# Patient Record
Sex: FEMALE | URBAN - METROPOLITAN AREA
[De-identification: names, ages, dates, MRNs, and addresses within clinical notes are randomized per-mention and may not be internally consistent; named-entity substitution may affect disease eponyms.]

---

## 2019-09-19 ENCOUNTER — APPOINTMENT (RX ONLY)
Dept: URBAN - METROPOLITAN AREA CLINIC 4 | Facility: CLINIC | Age: 69
Setting detail: DERMATOLOGY
End: 2019-09-19

## 2019-09-19 DIAGNOSIS — D22 MELANOCYTIC NEVI: ICD-10-CM

## 2019-09-19 DIAGNOSIS — L82.1 OTHER SEBORRHEIC KERATOSIS: ICD-10-CM

## 2019-09-19 DIAGNOSIS — B07.8 OTHER VIRAL WARTS: ICD-10-CM

## 2019-09-19 DIAGNOSIS — L57.0 ACTINIC KERATOSIS: ICD-10-CM

## 2019-09-19 DIAGNOSIS — L81.4 OTHER MELANIN HYPERPIGMENTATION: ICD-10-CM

## 2019-09-19 DIAGNOSIS — D18.0 HEMANGIOMA: ICD-10-CM

## 2019-09-19 PROBLEM — J45.909 UNSPECIFIED ASTHMA, UNCOMPLICATED: Status: ACTIVE | Noted: 2019-09-19

## 2019-09-19 PROBLEM — D18.01 HEMANGIOMA OF SKIN AND SUBCUTANEOUS TISSUE: Status: ACTIVE | Noted: 2019-09-19

## 2019-09-19 PROBLEM — E05.90 THYROTOXICOSIS, UNSPECIFIED WITHOUT THYROTOXIC CRISIS OR STORM: Status: ACTIVE | Noted: 2019-09-19

## 2019-09-19 PROBLEM — D48.5 NEOPLASM OF UNCERTAIN BEHAVIOR OF SKIN: Status: ACTIVE | Noted: 2019-09-19

## 2019-09-19 PROBLEM — D22.5 MELANOCYTIC NEVI OF TRUNK: Status: ACTIVE | Noted: 2019-09-19

## 2019-09-19 PROCEDURE — 11102 TANGNTL BX SKIN SINGLE LES: CPT

## 2019-09-19 PROCEDURE — 17000 DESTRUCT PREMALG LESION: CPT | Mod: 59

## 2019-09-19 PROCEDURE — ? LIQUID NITROGEN

## 2019-09-19 PROCEDURE — ? BIOPSY BY SHAVE METHOD

## 2019-09-19 PROCEDURE — ? COUNSELING

## 2019-09-19 PROCEDURE — 99203 OFFICE O/P NEW LOW 30 MIN: CPT | Mod: 25

## 2019-09-19 ASSESSMENT — LOCATION ZONE DERM
LOCATION ZONE: ARM
LOCATION ZONE: FACE
LOCATION ZONE: LEG
LOCATION ZONE: TRUNK

## 2019-09-19 ASSESSMENT — LOCATION DETAILED DESCRIPTION DERM
LOCATION DETAILED: PERIUMBILICAL SKIN
LOCATION DETAILED: LEFT DISTAL PRETIBIAL REGION
LOCATION DETAILED: RIGHT SUPERIOR FOREHEAD
LOCATION DETAILED: LEFT MEDIAL UPPER BACK
LOCATION DETAILED: RIGHT DISTAL PRETIBIAL REGION
LOCATION DETAILED: RIGHT INFERIOR MEDIAL UPPER BACK
LOCATION DETAILED: RIGHT LATERAL DISTAL PRETIBIAL REGION
LOCATION DETAILED: EPIGASTRIC SKIN
LOCATION DETAILED: RIGHT DISTAL DORSAL FOREARM

## 2019-09-19 ASSESSMENT — LOCATION SIMPLE DESCRIPTION DERM
LOCATION SIMPLE: LEFT PRETIBIAL REGION
LOCATION SIMPLE: RIGHT UPPER BACK
LOCATION SIMPLE: RIGHT FOREARM
LOCATION SIMPLE: RIGHT FOREHEAD
LOCATION SIMPLE: ABDOMEN
LOCATION SIMPLE: RIGHT PRETIBIAL REGION
LOCATION SIMPLE: LEFT UPPER BACK

## 2019-09-19 ASSESSMENT — PAIN INTENSITY VAS: HOW INTENSE IS YOUR PAIN 0 BEING NO PAIN, 10 BEING THE MOST SEVERE PAIN POSSIBLE?: NO PAIN

## 2019-09-19 NOTE — PROCEDURE: MIPS QUALITY
Quality 474: Zoster Vaccination Status: Shingrix Vaccination not Administered or Previously Received, Reason not Otherwise Specified
Quality 111:Pneumonia Vaccination Status For Older Adults: Pneumococcal Vaccination not Administered or Previously Received, Reason not Otherwise Specified
Detail Level: Detailed
Quality 130: Documentation Of Current Medications In The Medical Record: Current Medications Documented
Quality 131: Pain Assessment And Follow-Up: Pain assessment using a standardized tool is documented as negative, no follow-up plan required
Quality 110: Preventive Care And Screening: Influenza Immunization: Influenza immunization was not ordered or administered, reason not given

## 2019-09-19 NOTE — PROCEDURE: COUNSELING
Detail Level: Generalized
Patient Specific Counseling (Will Not Stick From Patient To Patient): Patient advised to discard razors and to hold on shaving if possible.
Detail Level: Zone
Detail Level: Detailed

## 2019-09-19 NOTE — HPI: EVALUATION OF SKIN LESION(S)
What Type Of Note Output Would You Prefer (Optional)?: Standard Output
Hpi Title: Evaluation of Skin Lesions
How Severe Are Your Spot(S)?: mild
Have Your Spot(S) Been Treated In The Past?: has not been treated
Family Member: Nephew
Additional History: Last saw dermatologist in [place] more than 5 years ago.

## 2019-09-19 NOTE — PROCEDURE: BIOPSY BY SHAVE METHOD
Billing Type: Third-Party Bill
Render Path Notes In Note?: No
Hemostasis: Drysol
Lab: [id number]
Detail Level: Detailed
Biopsy Method: Dermablade
Curettage Text: The wound bed was treated with curettage after the biopsy was performed.
Wound Care: Petrolatum
Additional Anesthesia Volume In Cc (Will Not Render If 0): 0
Electrodesiccation Text: The wound bed was treated with electrodesiccation after the biopsy was performed.
Cryotherapy Text: The wound bed was treated with cryotherapy after the biopsy was performed.
Anesthesia Type: 1% lidocaine with epinephrine
Post-Care Instructions: I reviewed with the patient in detail post-care instructions. Patient is to keep the biopsy site dry overnight, and then apply bacitracin twice daily until healed. Patient may apply hydrogen peroxide soaks to remove any crusting.
Lab Facility: [id number]
Size Of Lesion In Cm: 0.3
Type Of Destruction Used: Curettage
Biopsy Type: H and E
Electrodesiccation And Curettage Text: The wound bed was treated with electrodesiccation and curettage after the biopsy was performed.
Consent: Written consent was obtained and risks were reviewed including but not limited to scarring, infection, bleeding, scabbing, incomplete removal, nerve damage and allergy to anesthesia.
Depth Of Biopsy: dermis
Notification Instructions: Patient will be notified of biopsy results. However, patient instructed to call the office if not contacted within 2 weeks.
Silver Nitrate Text: The wound bed was treated with silver nitrate after the biopsy was performed.
Was A Bandage Applied: Yes
Anesthesia Volume In Cc (Will Not Render If 0): 0.5
Dressing: bandage

## 2021-01-20 ENCOUNTER — APPOINTMENT (RX ONLY)
Dept: URBAN - METROPOLITAN AREA CLINIC 4 | Facility: CLINIC | Age: 71
Setting detail: DERMATOLOGY
End: 2021-01-20

## 2021-01-20 DIAGNOSIS — D22 MELANOCYTIC NEVI: ICD-10-CM

## 2021-01-20 DIAGNOSIS — D18.0 HEMANGIOMA: ICD-10-CM

## 2021-01-20 DIAGNOSIS — L57.8 OTHER SKIN CHANGES DUE TO CHRONIC EXPOSURE TO NONIONIZING RADIATION: ICD-10-CM | Status: STABLE

## 2021-01-20 DIAGNOSIS — L81.4 OTHER MELANIN HYPERPIGMENTATION: ICD-10-CM

## 2021-01-20 DIAGNOSIS — L82.1 OTHER SEBORRHEIC KERATOSIS: ICD-10-CM

## 2021-01-20 DIAGNOSIS — L72.8 OTHER FOLLICULAR CYSTS OF THE SKIN AND SUBCUTANEOUS TISSUE: ICD-10-CM

## 2021-01-20 PROBLEM — D18.01 HEMANGIOMA OF SKIN AND SUBCUTANEOUS TISSUE: Status: ACTIVE | Noted: 2021-01-20

## 2021-01-20 PROBLEM — D22.5 MELANOCYTIC NEVI OF TRUNK: Status: ACTIVE | Noted: 2021-01-20

## 2021-01-20 PROCEDURE — ? COUNSELING

## 2021-01-20 PROCEDURE — ? CONSULTATION EXCISION

## 2021-01-20 PROCEDURE — 99213 OFFICE O/P EST LOW 20 MIN: CPT

## 2021-01-20 PROCEDURE — ? SUNSCREEN TREATMENT REGIMEN

## 2021-01-20 ASSESSMENT — LOCATION DETAILED DESCRIPTION DERM
LOCATION DETAILED: LEFT CENTRAL TEMPLE
LOCATION DETAILED: RIGHT CENTRAL MALAR CHEEK
LOCATION DETAILED: PERIUMBILICAL SKIN
LOCATION DETAILED: RIGHT LATERAL MANDIBULAR CHEEK
LOCATION DETAILED: RIGHT INFERIOR MEDIAL UPPER BACK
LOCATION DETAILED: LEFT MEDIAL UPPER BACK
LOCATION DETAILED: EPIGASTRIC SKIN
LOCATION DETAILED: RIGHT MEDIAL ZYGOMA

## 2021-01-20 ASSESSMENT — LOCATION SIMPLE DESCRIPTION DERM
LOCATION SIMPLE: LEFT UPPER BACK
LOCATION SIMPLE: RIGHT CHEEK
LOCATION SIMPLE: RIGHT UPPER BACK
LOCATION SIMPLE: RIGHT ZYGOMA
LOCATION SIMPLE: LEFT TEMPLE
LOCATION SIMPLE: ABDOMEN

## 2021-01-20 ASSESSMENT — LOCATION ZONE DERM
LOCATION ZONE: FACE
LOCATION ZONE: TRUNK

## 2021-01-20 NOTE — HPI: EVALUATION OF SKIN LESION(S)
What Type Of Note Output Would You Prefer (Optional)?: Standard Output
Hpi Title: Evaluation of Skin Lesions
Have Your Spot(S) Been Treated In The Past?: has not been treated
Family Member: Nephew

## 2021-01-20 NOTE — PROCEDURE: MIPS QUALITY
Quality 431: Preventive Care And Screening: Unhealthy Alcohol Use - Screening: Patient screened for unhealthy alcohol use using a single question and scores less than 2 times per year
Quality 110: Preventive Care And Screening: Influenza Immunization: Influenza Immunization not Administered because Patient Refused.
Quality 226: Preventive Care And Screening: Tobacco Use: Screening And Cessation Intervention: Patient screened for tobacco use and is an ex/non-smoker
Quality 111:Pneumonia Vaccination Status For Older Adults: Pneumococcal Vaccination not Administered or Previously Received, Reason not Otherwise Specified
Detail Level: Detailed
Quality 130: Documentation Of Current Medications In The Medical Record: Current Medications Documented

## 2021-01-20 NOTE — HPI: SKIN LESIONS
How Severe Is Your Skin Lesion?: mild
Have Your Skin Lesions Been Treated?: not been treated
Is This A New Presentation, Or A Follow-Up?: Skin Lesions
Which Family Member (Optional)?: Nephew

## 2022-02-02 ENCOUNTER — APPOINTMENT (RX ONLY)
Dept: URBAN - METROPOLITAN AREA CLINIC 4 | Facility: CLINIC | Age: 72
Setting detail: DERMATOLOGY
End: 2022-02-02

## 2022-02-02 DIAGNOSIS — L72.8 OTHER FOLLICULAR CYSTS OF THE SKIN AND SUBCUTANEOUS TISSUE: ICD-10-CM

## 2022-02-02 DIAGNOSIS — L82.1 OTHER SEBORRHEIC KERATOSIS: ICD-10-CM

## 2022-02-02 DIAGNOSIS — D22 MELANOCYTIC NEVI: ICD-10-CM

## 2022-02-02 DIAGNOSIS — L82.0 INFLAMED SEBORRHEIC KERATOSIS: ICD-10-CM

## 2022-02-02 DIAGNOSIS — D18.0 HEMANGIOMA: ICD-10-CM

## 2022-02-02 DIAGNOSIS — L57.8 OTHER SKIN CHANGES DUE TO CHRONIC EXPOSURE TO NONIONIZING RADIATION: ICD-10-CM

## 2022-02-02 DIAGNOSIS — L81.4 OTHER MELANIN HYPERPIGMENTATION: ICD-10-CM

## 2022-02-02 DIAGNOSIS — L70.8 OTHER ACNE: ICD-10-CM

## 2022-02-02 PROBLEM — D18.01 HEMANGIOMA OF SKIN AND SUBCUTANEOUS TISSUE: Status: ACTIVE | Noted: 2022-02-02

## 2022-02-02 PROBLEM — D22.5 MELANOCYTIC NEVI OF TRUNK: Status: ACTIVE | Noted: 2022-02-02

## 2022-02-02 PROCEDURE — ? SUNSCREEN TREATMENT REGIMEN

## 2022-02-02 PROCEDURE — 17110 DESTRUCTION B9 LES UP TO 14: CPT

## 2022-02-02 PROCEDURE — ? EXTRACTIONS

## 2022-02-02 PROCEDURE — ? OBSERVATION AND MEASURE

## 2022-02-02 PROCEDURE — ? CONSULTATION EXCISION

## 2022-02-02 PROCEDURE — ? LIQUID NITROGEN

## 2022-02-02 PROCEDURE — 99213 OFFICE O/P EST LOW 20 MIN: CPT | Mod: 25

## 2022-02-02 ASSESSMENT — LOCATION DETAILED DESCRIPTION DERM
LOCATION DETAILED: RIGHT INFERIOR LATERAL MALAR CHEEK
LOCATION DETAILED: RIGHT INFERIOR PREAURICULAR CHEEK
LOCATION DETAILED: PERIUMBILICAL SKIN
LOCATION DETAILED: LEFT CENTRAL TEMPLE
LOCATION DETAILED: RIGHT CENTRAL MALAR CHEEK
LOCATION DETAILED: RIGHT INFERIOR CENTRAL MALAR CHEEK
LOCATION DETAILED: LEFT MEDIAL UPPER BACK
LOCATION DETAILED: EPIGASTRIC SKIN
LOCATION DETAILED: RIGHT INFERIOR MEDIAL UPPER BACK
LOCATION DETAILED: RIGHT MEDIAL ZYGOMA

## 2022-02-02 ASSESSMENT — LOCATION SIMPLE DESCRIPTION DERM
LOCATION SIMPLE: LEFT TEMPLE
LOCATION SIMPLE: RIGHT UPPER BACK
LOCATION SIMPLE: RIGHT ZYGOMA
LOCATION SIMPLE: ABDOMEN
LOCATION SIMPLE: LEFT UPPER BACK
LOCATION SIMPLE: RIGHT CHEEK

## 2022-02-02 ASSESSMENT — LOCATION ZONE DERM
LOCATION ZONE: FACE
LOCATION ZONE: TRUNK

## 2022-02-02 NOTE — PROCEDURE: LIQUID NITROGEN
Spray Paint Technique: No
Show Topical Anesthesia Variable?: Yes
Spray Paint Text: The liquid nitrogen was applied to the skin utilizing a spray paint frosting technique.
Consent: The patient's consent was obtained including but not limited to risks of crusting, scabbing, blistering, scarring, darker or lighter pigmentary change, recurrence, incomplete removal and infection.
Medical Necessity Information: It is in your best interest to select a reason for this procedure from the list below. All of these items fulfill various CMS LCD requirements except the new and changing color options.
Detail Level: Detailed
Post-Care Instructions: I reviewed with the patient in detail post-care instructions. Patient is to wear sunprotection, and avoid picking at any of the treated lesions. Pt may apply Vaseline to crusted or scabbing areas.
Medical Necessity Clause: This procedure was medically necessary because the lesions that were treated were:

## 2022-02-02 NOTE — PROCEDURE: CONSULTATION EXCISION
Detail Level: Detailed
X Size Of Lesion In Cm (Optional): 0
Size Of Lesion: 1
[Negative] : Endocrine

## 2022-02-02 NOTE — PROCEDURE: EXTRACTIONS
Post-Care Instructions: I reviewed with the patient in detail post-care instructions. Patient is to keep the treatment areas dry overnight, and then apply bacitracin twice daily until healed. Patient may apply hydrogen peroxide soaks to remove any crusting.
Extraction Method: 21 gauge needle and comedo extractor
Render Post-Care Instructions In Note?: no
Consent was obtained and risks were reviewed including but not limited to scarring, infection, bleeding, scabbing, incomplete removal, and allergy to anesthesia.
Detail Level: Detailed
Prep Text (Optional): Prior to removal the treatment areas were prepped in the usual fashion.
Acne Type: A cyst

## 2022-05-24 ENCOUNTER — OFFICE VISIT (OUTPATIENT)
Dept: FAMILY MEDICINE CLINIC | Facility: CLINIC | Age: 72
End: 2022-05-24
Payer: COMMERCIAL

## 2022-05-24 VITALS
HEIGHT: 62 IN | SYSTOLIC BLOOD PRESSURE: 90 MMHG | WEIGHT: 118 LBS | OXYGEN SATURATION: 98 % | BODY MASS INDEX: 21.71 KG/M2 | TEMPERATURE: 96.7 F | HEART RATE: 78 BPM | DIASTOLIC BLOOD PRESSURE: 60 MMHG

## 2022-05-24 DIAGNOSIS — Z86.010 PERSONAL HISTORY OF COLONIC POLYPS: ICD-10-CM

## 2022-05-24 DIAGNOSIS — Z00.00 ANNUAL PHYSICAL EXAM: Primary | ICD-10-CM

## 2022-05-24 DIAGNOSIS — M81.0 AGE-RELATED OSTEOPOROSIS WITHOUT CURRENT PATHOLOGICAL FRACTURE: ICD-10-CM

## 2022-05-24 DIAGNOSIS — Z13.83 SCREENING FOR CARDIOVASCULAR, RESPIRATORY, AND GENITOURINARY DISEASES: ICD-10-CM

## 2022-05-24 DIAGNOSIS — E06.3 HYPOTHYROIDISM DUE TO HASHIMOTO'S THYROIDITIS: ICD-10-CM

## 2022-05-24 DIAGNOSIS — Z13.6 SCREENING FOR CARDIOVASCULAR, RESPIRATORY, AND GENITOURINARY DISEASES: ICD-10-CM

## 2022-05-24 DIAGNOSIS — E03.8 HYPOTHYROIDISM DUE TO HASHIMOTO'S THYROIDITIS: ICD-10-CM

## 2022-05-24 DIAGNOSIS — Z13.89 SCREENING FOR CARDIOVASCULAR, RESPIRATORY, AND GENITOURINARY DISEASES: ICD-10-CM

## 2022-05-24 DIAGNOSIS — Z12.31 ENCOUNTER FOR SCREENING MAMMOGRAM FOR BREAST CANCER: ICD-10-CM

## 2022-05-24 DIAGNOSIS — E55.9 VITAMIN D DEFICIENCY: ICD-10-CM

## 2022-05-24 PROBLEM — M54.50 CHRONIC BILATERAL LOW BACK PAIN WITHOUT SCIATICA: Status: ACTIVE | Noted: 2021-04-14

## 2022-05-24 PROBLEM — S52.501D CLOSED FRACTURE OF DISTAL END OF RIGHT RADIUS WITH ROUTINE HEALING: Status: ACTIVE | Noted: 2020-09-24

## 2022-05-24 PROBLEM — E03.9 HYPOTHYROIDISM: Status: ACTIVE | Noted: 2022-05-24

## 2022-05-24 PROBLEM — G89.29 CHRONIC BILATERAL LOW BACK PAIN WITHOUT SCIATICA: Status: ACTIVE | Noted: 2021-04-14

## 2022-05-24 PROCEDURE — 1036F TOBACCO NON-USER: CPT | Performed by: FAMILY MEDICINE

## 2022-05-24 PROCEDURE — 1160F RVW MEDS BY RX/DR IN RCRD: CPT | Performed by: FAMILY MEDICINE

## 2022-05-24 PROCEDURE — 3725F SCREEN DEPRESSION PERFORMED: CPT | Performed by: FAMILY MEDICINE

## 2022-05-24 PROCEDURE — 3008F BODY MASS INDEX DOCD: CPT | Performed by: FAMILY MEDICINE

## 2022-05-24 PROCEDURE — 3288F FALL RISK ASSESSMENT DOCD: CPT | Performed by: FAMILY MEDICINE

## 2022-05-24 PROCEDURE — 1101F PT FALLS ASSESS-DOCD LE1/YR: CPT | Performed by: FAMILY MEDICINE

## 2022-05-24 PROCEDURE — 99387 INIT PM E/M NEW PAT 65+ YRS: CPT | Performed by: FAMILY MEDICINE

## 2022-05-24 RX ORDER — LEVOTHYROXINE SODIUM 0.07 MG/1
75 TABLET ORAL DAILY
COMMUNITY
Start: 2022-03-25 | End: 2022-05-31 | Stop reason: SDUPTHER

## 2022-05-24 RX ORDER — LEVOTHYROXINE SODIUM 88 UG/1
88 TABLET ORAL
COMMUNITY
End: 2022-05-24 | Stop reason: ALTCHOICE

## 2022-05-24 NOTE — PROGRESS NOTES
[address]    NAME: Javed Barroso  AGE: 73 y o  SEX: female  : 1950     DATE: 2022     Assessment and Plan:     Problem List Items Addressed This Visit        Endocrine    Hypothyroidism     Patient appears to be euthyroid  Check TSH  Continue present medication for now  Adjust dose of TSH not at goal   Recheck 6 months           Relevant Orders    TSH, 3rd generation (Completed)       Musculoskeletal and Integument    Age related osteoporosis     Patient presently on vitamin-D only  Will recheck DEXA scan  Consider anti resorptive therapy osteoporosis persist            Relevant Orders    DXA bone density spine hip and pelvis    Vitamin D 25 hydroxy (Completed)       Other    Vitamin D deficiency     Check vitamin-D level  Adjust dose not at goal            Relevant Orders    Vitamin D 25 hydroxy (Completed)      Other Visit Diagnoses     Annual physical exam    -  Primary    Screening for cardiovascular, respiratory, and genitourinary diseases        Relevant Orders    CBC and Platelet (Completed)    Comprehensive metabolic panel (Completed)    Lipid panel (Completed)    Encounter for screening mammogram for breast cancer        Relevant Orders    Mammo screening bilateral w 3d & cad    Personal history of colonic polyps        Relevant Orders    Ambulatory referral for Colonoscopy          Immunizations and preventive care screenings were discussed with patient today  Appropriate education was printed on patient's after visit summary  Counseling:  Exercise: the importance of regular exercise/physical activity was discussed  Recommend exercise 3-5 times per week for at least 30 minutes  Return in 1 year (on 2023)       Chief Complaint:     Chief Complaint   Patient presents with   [address] thyroid    Annual Exam      History of Present Illness:     Adult Annual Physical   Patient here to be established and for a comprehensive physical exam  The patient reports problems - as below  - patient with a history of hypothyroidism  She had been on levothyroxine 88 mcg p o  Daily for years but recently, before moving to this area, a TSH was low in here PCP decreased level thyroxine to 75 mcg a day  Patient had repeat labs done in March which were reportedly normal   (these labs are not available for review)  - patient was also found to have low Vit D level  Pt due for recheck   - she exercises regularly  Denies CP, palpitations, lightheadedness or other CV symptoms with or without exertion  - She denies any GI or  issues  - I reviewed PMHx, PSHx, Fam Hx and Soc Hx with pt    - full ROS done    Diet and Physical Activity  Diet/Nutrition: well balanced diet  Exercise: moderate cardiovascular exercise, 3-4 times a week on average and 30-60 minutes on average  Depression Screening  PHQ-2/9 Depression Screening    Little interest or pleasure in doing things: 0 - not at all  Feeling down, depressed, or hopeless: 0 - not at all  PHQ-2 Score: 0  PHQ-2 Interpretation: Negative depression screen       General Health  Sleep: sleeps well and gets 7-8 hours of sleep on average  Hearing: normal - bilateral   Vision: no vision problems  Dental: regular dental visits and brushes teeth twice daily  /GYN Health  Patient is: postmenopausal  Last menstrual period: age 56  Contraceptive method: na      Review of Systems:     Review of Systems   Constitutional: Negative  HENT: Negative  Eyes: Negative  Respiratory: Negative  Cardiovascular: Negative  Gastrointestinal: Negative  Endocrine: Negative  Genitourinary: Negative  Musculoskeletal: Negative  Skin: Negative  Allergic/Immunologic: Negative  Neurological: Negative  Hematological: Negative  Psychiatric/Behavioral: Negative         Past Medical History:     Past Medical History:   Diagnosis Date    Age-related osteoporosis without current pathological fracture     Hypothyroidism due to Hashimoto's thyroiditis       Past Surgical History:     Past Surgical History:   Procedure Laterality Date    ORIF WRIST FRACTURE        Social History:     Social History     Socioeconomic History    Marital status: /Civil Union     Spouse name: Elvira Garrison"    Number of children: 11    Years of education: Nursing school    Highest education level: None   Occupational History    None   Tobacco Use    Smoking status: Never Smoker    Smokeless tobacco: Never Used   Vaping Use    Vaping Use: Never used   Substance and Sexual Activity    Alcohol use: Yes     Comment: social, <1/month    Drug use: Never    Sexual activity: Yes     Partners: Male   Other Topics Concern    None   Social History Narrative    None     Social Determinants of Health     Financial Resource Strain: Not on file   Food Insecurity: Not on file   Transportation Needs: Not on file   Physical Activity: Not on file   Stress: Not on file   Social Connections: Not on file   Intimate Partner Violence: Not on file   Housing Stability: Not on file      Family History:     Family History   Problem Relation Age of Onset    Cancer Mother     Lung cancer Father     Diabetes Maternal Grandmother       Current Medications:     Current Outpatient Medications   Medication Sig Dispense Refill    levothyroxine 75 mcg tablet Take 75 mcg by mouth in the morning  No current facility-administered medications for this visit  Allergies:     No Known Allergies   Physical Exam:     BP 90/60   Pulse 78   Temp (!) 96 7 °F (35 9 °C)   Ht 5' 2" (1 575 m)   Wt 53 5 kg (118 lb)   SpO2 98%   BMI 21 58 kg/m²       Physical Exam  Vitals reviewed  Constitutional:       Appearance: She is well-developed  HENT:      Head: Normocephalic and atraumatic        Right Ear: Tympanic membrane, ear canal and external ear normal       Left Ear: Tympanic membrane, ear canal and external ear normal       Mouth/Throat:      Mouth: Mucous membranes are moist    Eyes:      Extraocular Movements: Extraocular movements intact  Conjunctiva/sclera: Conjunctivae normal       Pupils: Pupils are equal, round, and reactive to light  Neck:      Thyroid: No thyromegaly  Vascular: No JVD  Cardiovascular:      Rate and Rhythm: Normal rate and regular rhythm  Pulses: Normal pulses  Heart sounds: Normal heart sounds  No murmur heard  Pulmonary:      Effort: Pulmonary effort is normal       Breath sounds: Normal breath sounds  No wheezing  Abdominal:      General: Bowel sounds are normal  There is no distension  Palpations: Abdomen is soft  There is no mass  Tenderness: There is no abdominal tenderness  Musculoskeletal:         General: No swelling, tenderness or deformity  Normal range of motion  Cervical back: Normal range of motion and neck supple  No tenderness  No muscular tenderness  Right lower leg: No edema  Left lower leg: No edema  Lymphadenopathy:      Cervical: No cervical adenopathy  Skin:     General: Skin is warm  Capillary Refill: Capillary refill takes less than 2 seconds  Neurological:      General: No focal deficit present  Mental Status: She is alert and oriented to person, place, and time  Cranial Nerves: No cranial nerve deficit  Sensory: No sensory deficit  Motor: No weakness or abnormal muscle tone  Coordination: Coordination normal       Gait: Gait normal       Deep Tendon Reflexes: Reflexes normal    Psychiatric:         Mood and Affect: Mood normal          Behavior: Behavior normal          Thought Content:  Thought content normal          Judgment: Judgment normal       Comments: PHQ-2/9 Depression Screening    Little interest or pleasure in doing things: 0 - not at all  Feeling down, depressed, or hopeless: 0 - not at all  PHQ-2 Score: 0  PHQ-2 Interpretation: Negative depression screen               MD Roberto La

## 2022-05-24 NOTE — PATIENT INSTRUCTIONS

## 2022-05-25 ENCOUNTER — APPOINTMENT (OUTPATIENT)
Dept: LAB | Facility: CLINIC | Age: 72
End: 2022-05-25
Payer: COMMERCIAL

## 2022-05-25 DIAGNOSIS — Z13.89 SCREENING FOR CARDIOVASCULAR, RESPIRATORY, AND GENITOURINARY DISEASES: ICD-10-CM

## 2022-05-25 DIAGNOSIS — Z13.6 SCREENING FOR CARDIOVASCULAR, RESPIRATORY, AND GENITOURINARY DISEASES: ICD-10-CM

## 2022-05-25 DIAGNOSIS — E55.9 VITAMIN D DEFICIENCY: ICD-10-CM

## 2022-05-25 DIAGNOSIS — Z13.83 SCREENING FOR CARDIOVASCULAR, RESPIRATORY, AND GENITOURINARY DISEASES: ICD-10-CM

## 2022-05-25 DIAGNOSIS — E06.3 HYPOTHYROIDISM DUE TO HASHIMOTO'S THYROIDITIS: ICD-10-CM

## 2022-05-25 DIAGNOSIS — E03.8 HYPOTHYROIDISM DUE TO HASHIMOTO'S THYROIDITIS: ICD-10-CM

## 2022-05-25 DIAGNOSIS — M81.0 AGE-RELATED OSTEOPOROSIS WITHOUT CURRENT PATHOLOGICAL FRACTURE: ICD-10-CM

## 2022-05-25 LAB
25(OH)D3 SERPL-MCNC: 74.9 NG/ML (ref 30–100)
ALBUMIN SERPL BCP-MCNC: 3.5 G/DL (ref 3.5–5)
ALP SERPL-CCNC: 88 U/L (ref 46–116)
ALT SERPL W P-5'-P-CCNC: 22 U/L (ref 12–78)
ANION GAP SERPL CALCULATED.3IONS-SCNC: -2 MMOL/L (ref 4–13)
AST SERPL W P-5'-P-CCNC: 25 U/L (ref 5–45)
BILIRUB SERPL-MCNC: 1.27 MG/DL (ref 0.2–1)
BUN SERPL-MCNC: 19 MG/DL (ref 5–25)
CALCIUM SERPL-MCNC: 9.9 MG/DL (ref 8.3–10.1)
CHLORIDE SERPL-SCNC: 107 MMOL/L (ref 100–108)
CHOLEST SERPL-MCNC: 200 MG/DL
CO2 SERPL-SCNC: 33 MMOL/L (ref 21–32)
CREAT SERPL-MCNC: 0.84 MG/DL (ref 0.6–1.3)
ERYTHROCYTE [DISTWIDTH] IN BLOOD BY AUTOMATED COUNT: 12.2 % (ref 11.6–15.1)
GFR SERPL CREATININE-BSD FRML MDRD: 71 ML/MIN/1.73SQ M
GLUCOSE P FAST SERPL-MCNC: 94 MG/DL (ref 65–99)
HCT VFR BLD AUTO: 38.2 % (ref 34.8–46.1)
HDLC SERPL-MCNC: 66 MG/DL
HGB BLD-MCNC: 12.4 G/DL (ref 11.5–15.4)
LDLC SERPL CALC-MCNC: 118 MG/DL (ref 0–100)
MCH RBC QN AUTO: 30.8 PG (ref 26.8–34.3)
MCHC RBC AUTO-ENTMCNC: 32.5 G/DL (ref 31.4–37.4)
MCV RBC AUTO: 95 FL (ref 82–98)
NONHDLC SERPL-MCNC: 134 MG/DL
PLATELET # BLD AUTO: 248 THOUSANDS/UL (ref 149–390)
PMV BLD AUTO: 10.7 FL (ref 8.9–12.7)
POTASSIUM SERPL-SCNC: 5.7 MMOL/L (ref 3.5–5.3)
PROT SERPL-MCNC: 7 G/DL (ref 6.4–8.2)
RBC # BLD AUTO: 4.03 MILLION/UL (ref 3.81–5.12)
SODIUM SERPL-SCNC: 138 MMOL/L (ref 136–145)
TRIGL SERPL-MCNC: 79 MG/DL
TSH SERPL DL<=0.05 MIU/L-ACNC: 2.13 UIU/ML (ref 0.45–4.5)
WBC # BLD AUTO: 6.48 THOUSAND/UL (ref 4.31–10.16)

## 2022-05-25 PROCEDURE — 36415 COLL VENOUS BLD VENIPUNCTURE: CPT

## 2022-05-25 PROCEDURE — 85027 COMPLETE CBC AUTOMATED: CPT

## 2022-05-25 PROCEDURE — 80053 COMPREHEN METABOLIC PANEL: CPT

## 2022-05-25 PROCEDURE — 82306 VITAMIN D 25 HYDROXY: CPT

## 2022-05-25 PROCEDURE — 84443 ASSAY THYROID STIM HORMONE: CPT

## 2022-05-25 PROCEDURE — 80061 LIPID PANEL: CPT

## 2022-05-25 NOTE — ASSESSMENT & PLAN NOTE
Patient presently on vitamin-D only  Will recheck DEXA scan    Consider anti resorptive therapy osteoporosis persist

## 2022-05-26 DIAGNOSIS — E87.5 HYPERKALEMIA: Primary | ICD-10-CM

## 2022-05-28 ENCOUNTER — APPOINTMENT (OUTPATIENT)
Dept: LAB | Facility: CLINIC | Age: 72
End: 2022-05-28
Payer: COMMERCIAL

## 2022-05-28 DIAGNOSIS — E87.5 HYPERKALEMIA: ICD-10-CM

## 2022-05-28 LAB
ANION GAP SERPL CALCULATED.3IONS-SCNC: 4 MMOL/L (ref 4–13)
BUN SERPL-MCNC: 17 MG/DL (ref 5–25)
CALCIUM SERPL-MCNC: 9.1 MG/DL (ref 8.4–10.2)
CHLORIDE SERPL-SCNC: 101 MMOL/L (ref 96–108)
CO2 SERPL-SCNC: 32 MMOL/L (ref 21–32)
CREAT SERPL-MCNC: 0.75 MG/DL (ref 0.6–1.3)
GFR SERPL CREATININE-BSD FRML MDRD: 81 ML/MIN/1.73SQ M
GLUCOSE P FAST SERPL-MCNC: 92 MG/DL (ref 65–99)
POTASSIUM SERPL-SCNC: 4.6 MMOL/L (ref 3.5–5.3)
SODIUM SERPL-SCNC: 137 MMOL/L (ref 135–147)

## 2022-05-28 PROCEDURE — 80048 BASIC METABOLIC PNL TOTAL CA: CPT

## 2022-05-28 PROCEDURE — 36415 COLL VENOUS BLD VENIPUNCTURE: CPT

## 2022-05-31 ENCOUNTER — TELEPHONE (OUTPATIENT)
Dept: FAMILY MEDICINE CLINIC | Facility: CLINIC | Age: 72
End: 2022-05-31

## 2022-05-31 DIAGNOSIS — E03.8 HYPOTHYROIDISM DUE TO HASHIMOTO'S THYROIDITIS: Primary | ICD-10-CM

## 2022-05-31 DIAGNOSIS — E06.3 HYPOTHYROIDISM DUE TO HASHIMOTO'S THYROIDITIS: Primary | ICD-10-CM

## 2022-05-31 RX ORDER — LEVOTHYROXINE SODIUM 0.07 MG/1
75 TABLET ORAL DAILY
Qty: 90 TABLET | Refills: 1 | Status: SHIPPED | OUTPATIENT
Start: 2022-05-31 | End: 2023-01-30 | Stop reason: SDUPTHER

## 2022-05-31 NOTE — TELEPHONE ENCOUNTER
Patient called  She noticed her TSH was normal and does still need a refill on her levothyroxine 75 mcg  She forgot to tell you though, she only takes it Mon-Saturday and skips Sundays   Asking for a 90 day to be sent to [place]

## 2022-07-12 ENCOUNTER — OFFICE VISIT (OUTPATIENT)
Dept: FAMILY MEDICINE CLINIC | Facility: CLINIC | Age: 72
End: 2022-07-12
Payer: COMMERCIAL

## 2022-07-12 VITALS
RESPIRATION RATE: 17 BRPM | SYSTOLIC BLOOD PRESSURE: 124 MMHG | WEIGHT: 117.5 LBS | HEIGHT: 63 IN | OXYGEN SATURATION: 100 % | DIASTOLIC BLOOD PRESSURE: 76 MMHG | BODY MASS INDEX: 20.82 KG/M2 | HEART RATE: 64 BPM | TEMPERATURE: 96.1 F

## 2022-07-12 DIAGNOSIS — M54.50 ACUTE RIGHT-SIDED LOW BACK PAIN WITHOUT SCIATICA: Primary | ICD-10-CM

## 2022-07-12 DIAGNOSIS — M54.2 NECK PAIN ON LEFT SIDE: ICD-10-CM

## 2022-07-12 PROCEDURE — 99213 OFFICE O/P EST LOW 20 MIN: CPT | Performed by: FAMILY MEDICINE

## 2022-07-12 RX ORDER — METHOCARBAMOL 500 MG/1
500 TABLET, FILM COATED ORAL 3 TIMES DAILY PRN
Qty: 20 TABLET | Refills: 0 | Status: SHIPPED | OUTPATIENT
Start: 2022-07-12 | End: 2022-10-28 | Stop reason: ALTCHOICE
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
## 2022-07-18 ENCOUNTER — EVALUATION (OUTPATIENT)
Dept: PHYSICAL THERAPY | Facility: CLINIC | Age: 72
End: 2022-07-18
Payer: COMMERCIAL

## 2022-07-18 DIAGNOSIS — M54.50 ACUTE RIGHT-SIDED LOW BACK PAIN WITHOUT SCIATICA: ICD-10-CM

## 2022-07-18 DIAGNOSIS — M54.2 NECK PAIN ON LEFT SIDE: ICD-10-CM

## 2022-07-18 PROCEDURE — 97110 THERAPEUTIC EXERCISES: CPT

## 2022-07-18 PROCEDURE — 97161 PT EVAL LOW COMPLEX 20 MIN: CPT
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
## 2022-07-21 ENCOUNTER — EVALUATION (OUTPATIENT)
Dept: PHYSICAL THERAPY | Facility: CLINIC | Age: 72
End: 2022-07-21
Payer: COMMERCIAL

## 2022-07-21 DIAGNOSIS — M54.2 NECK PAIN ON LEFT SIDE: ICD-10-CM

## 2022-07-21 DIAGNOSIS — M54.50 ACUTE RIGHT-SIDED LOW BACK PAIN WITHOUT SCIATICA: Primary | ICD-10-CM

## 2022-07-21 PROCEDURE — 97112 NEUROMUSCULAR REEDUCATION: CPT | Performed by: PHYSICAL THERAPIST

## 2022-07-21 PROCEDURE — 97140 MANUAL THERAPY 1/> REGIONS: CPT | Performed by: PHYSICAL THERAPIST

## 2022-07-21 PROCEDURE — 97110 THERAPEUTIC EXERCISES: CPT | Performed by: PHYSICAL THERAPIST

## 2022-07-21 NOTE — PROGRESS NOTES
Daily Note     Today's date: 2022  Patient name: Suly Engle  : 1950  MRN: [id number]  Referring provider: Ai Haley MD  Dx:   Encounter Diagnosis     ICD-10-CM    1  Acute right-sided low back pain without sciatica  M54 50    2  Neck pain on left side  M54 2                   Subjective: Patient reports she was unable to do HEP because she did not get email from evaluating PT  She notes her neck is a bit sore still, was not evaluated at initial session for unknown reason  PT will assess NV  Objective: See treatment diary below      Assessment: Tolerated treatment well  Patient would benefit from continued PT  She reports tenderness at right SIJ and piriformis with manuals today  No excessive heat with laser  Reviewed exercises today with good form, updated HEP  She has mild soreness at R SIJ with movement and into PPT though tolerates well  Plan: Progress treatment as tolerated         Precautions: Hypothyroid, Osteoporosis  HEP - [id number]      Manuals            Laser right SI joint  18W 1:30            Sacral mobs  6'           Lumbar A/P mobs             Piriformis release R   4'            Neuro Re-Ed             TA with march  NV           TA with bridge  NV           TA with iso Add/abd  10x5"            TA with PPT   10x 5"            Dead bug             Bird dogs              90/90 lifts              Ther Ex             SKC 10x5" 5x5"           LTR 10x5" 5x5"           Bridge 10x5"            Piriformis Stretch 5x30" 1'            Hamstring stretch 5x30" 20"X3           Standing 3 way hip                                       Ther Activity             Bike  5' lvl3                         Gait Training                                       Modalities

## 2022-08-01 ENCOUNTER — EVALUATION (OUTPATIENT)
Dept: PHYSICAL THERAPY | Facility: CLINIC | Age: 72
End: 2022-08-01
Payer: COMMERCIAL

## 2022-08-01 DIAGNOSIS — M54.50 ACUTE RIGHT-SIDED LOW BACK PAIN WITHOUT SCIATICA: Primary | ICD-10-CM

## 2022-08-01 DIAGNOSIS — M54.2 NECK PAIN ON LEFT SIDE: ICD-10-CM

## 2022-08-01 PROCEDURE — 97164 PT RE-EVAL EST PLAN CARE: CPT | Performed by: PHYSICAL THERAPIST

## 2022-08-01 PROCEDURE — 97110 THERAPEUTIC EXERCISES: CPT | Performed by: PHYSICAL THERAPIST

## 2022-08-01 PROCEDURE — 97140 MANUAL THERAPY 1/> REGIONS: CPT | Performed by: PHYSICAL THERAPIST
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
## 2022-08-04 ENCOUNTER — OFFICE VISIT (OUTPATIENT)
Dept: PHYSICAL THERAPY | Facility: CLINIC | Age: 72
End: 2022-08-04
Payer: COMMERCIAL

## 2022-08-04 DIAGNOSIS — M54.50 ACUTE RIGHT-SIDED LOW BACK PAIN WITHOUT SCIATICA: Primary | ICD-10-CM

## 2022-08-04 DIAGNOSIS — M54.2 NECK PAIN ON LEFT SIDE: ICD-10-CM

## 2022-08-04 PROCEDURE — 97112 NEUROMUSCULAR REEDUCATION: CPT | Performed by: PHYSICAL THERAPIST

## 2022-08-04 PROCEDURE — 97140 MANUAL THERAPY 1/> REGIONS: CPT | Performed by: PHYSICAL THERAPIST

## 2022-08-04 PROCEDURE — 97530 THERAPEUTIC ACTIVITIES: CPT | Performed by: PHYSICAL THERAPIST

## 2022-08-04 NOTE — PROGRESS NOTES
Daily Note     Today's date: 2022  Patient name: Rojelio Wood  : 1950  MRN: [id number]  Referring provider: Jere Laboy MD  Dx:   Encounter Diagnosis     ICD-10-CM    1  Acute right-sided low back pain without sciatica  M54 50    2  Neck pain on left side  M54 2                   Subjective: Patient states she got some right groin pain while briskly walking around 4 miles the other day  She states she can really feel pulling and tightness in low back when she drives and turns her body to look behind her  Objective: See treatment diary below      Assessment: Tolerated treatment well  Patient would benefit from continued PT  Mild discomfort noted with left side lying open books though pt is able to tolerate  Notes pinching is at PSIS  She has good form with exercises  Tolerates manuals to neck though left sided tightness persists  She reports relief after performing manuals to both sacrum and neck today  Plan: Progress treatment as tolerated         Precautions: Hypothyroid, Osteoporosis  [facility]      Manuals  RE neck           Laser right SI joint  18W 1:30   NV         Sacral mobs  6' 4' 6'         Lumbar A/P mobs   2' 2'         Piriformis release R   4'  NV          C/s stretching    6' 6'         UT/levator STM   6' 6'                                                             Neuro Re-Ed             TA with single leg lower   NV 10x bl           TA with bridge  NV           TA with iso Add/abd  10x5"  10" x5 ea           TA with PPT   10x 5"  10" x5          Dead bug             Bird dogs              90/90 lifts              TB row   NV          TB extensions   NV                                                                           Ther Ex             SKC 10x5" 5x5"           LTR 10x5" 5x5"           Bridge 10x5"            Piriformis Stretch 5x30" 1'            Hamstring stretch 5x30" 20"X3           Standing 3 way hip             UT stretch   10" x 5          Levator stretch   10"x5          Door pec stretch    NV                                                                                                     Ther Activity             Bike  5' lvl3  NV 5' lvl 3          Open book stretch   NV 5x5" ea          Db shoulder flexion             DB scaption             Ball rolls fwd/latera    5x5"         Modalities

## 2022-08-08 ENCOUNTER — OFFICE VISIT (OUTPATIENT)
Dept: PHYSICAL THERAPY | Facility: CLINIC | Age: 72
End: 2022-08-08
Payer: COMMERCIAL

## 2022-08-08 DIAGNOSIS — M54.50 ACUTE RIGHT-SIDED LOW BACK PAIN WITHOUT SCIATICA: Primary | ICD-10-CM

## 2022-08-08 DIAGNOSIS — M54.2 NECK PAIN ON LEFT SIDE: ICD-10-CM

## 2022-08-08 PROCEDURE — 97112 NEUROMUSCULAR REEDUCATION: CPT | Performed by: PHYSICAL THERAPIST

## 2022-08-08 PROCEDURE — 97140 MANUAL THERAPY 1/> REGIONS: CPT | Performed by: PHYSICAL THERAPIST

## 2022-08-08 PROCEDURE — 97110 THERAPEUTIC EXERCISES: CPT | Performed by: PHYSICAL THERAPIST

## 2022-08-08 NOTE — PROGRESS NOTES
Daily Note     Today's date: 2022  Patient name: Rojelio Wood  : 1950  MRN: [id number]  Referring provider: Jere Laboy MD  Dx:   Encounter Diagnosis     ICD-10-CM    1  Acute right-sided low back pain without sciatica  M54 50    2  Neck pain on left side  M54 2                   Subjective: Patient states she doesn't know why but she has had consistent increased tightness into her low back the last week  She notes her neck may be moving a bit better with the stretches  Objective: See treatment diary below      Assessment: Tolerated treatment well  Patient would benefit from continued PT  Pt reports tension in l/s when performing door pec stretch today due to activating hre core to prevent rotation  She has significantly increased tissue tension/spasms noted along right paraspinals and quatratus      Plan: Progress treatment as tolerated         Precautions: Hypothyroid, Osteoporosis  [facility]      Manuals  RE neck           Laser right SI joint  18W 1:30   NV         Sacral mobs  6' 4' 6' 6'        Lumbar A/P mobs   2' 2' 2'        Piriformis release R   4'  NV          C/s stretching    6' 6' 6'        UT/levator STM   6' 6' 6'        Right paraspinal trigger point release      4'                                               Neuro Re-Ed             TA with single leg lower   NV 10x bl           TA with bridge  NV           TA with iso Add/abd  10x5"  10" x5 ea           TA with PPT   10x 5"  10" x5          Dead bug             Bird dogs              90/90 lifts              TB row   NV  R Tb 2x10         TB extensions   NV R TB 2x10                                                                           Ther Ex             SKC 10x5" 5x5"           LTR 10x5" 5x5"           Bridge 10x5"            Piriformis Stretch 5x30" 1'            Hamstring stretch 5x30" 20"X3           Standing 3 way hip             UT stretch   10" x 5          Levator stretch   10"x5 Door pec stretch    NV                                                                                                     Ther Activity             Bike  5' lvl3  NV 5' lvl 3          Open book stretch   NV 5x5" ea          Db shoulder flexion             DB scaption             Ball rolls fwd/latera    5x5"         Modalities

## 2022-08-11 ENCOUNTER — OFFICE VISIT (OUTPATIENT)
Dept: PHYSICAL THERAPY | Facility: CLINIC | Age: 72
End: 2022-08-11
Payer: COMMERCIAL

## 2022-08-11 DIAGNOSIS — M54.2 NECK PAIN ON LEFT SIDE: ICD-10-CM

## 2022-08-11 DIAGNOSIS — M54.50 ACUTE RIGHT-SIDED LOW BACK PAIN WITHOUT SCIATICA: Primary | ICD-10-CM

## 2022-08-11 PROCEDURE — 97140 MANUAL THERAPY 1/> REGIONS: CPT | Performed by: PHYSICAL THERAPIST

## 2022-08-11 PROCEDURE — 97112 NEUROMUSCULAR REEDUCATION: CPT | Performed by: PHYSICAL THERAPIST

## 2022-08-11 PROCEDURE — 97110 THERAPEUTIC EXERCISES: CPT | Performed by: PHYSICAL THERAPIST

## 2022-08-11 NOTE — PROGRESS NOTES
Daily Note     Today's date: 2022  Patient name: Alyssia Dwyer  : 1950  MRN: [id number]  Referring provider: Abisai Harry MD  Dx:   Encounter Diagnosis     ICD-10-CM    1  Acute right-sided low back pain without sciatica  M54 50    2  Neck pain on left side  M54 2                   Subjective: Patient states she went in her son's hot tub a few days ago and had a lot of relief with the heat  Discussed moist hot pack for home  She notes she stil gets sore in her mid low back on right and PSIS  Objective: See treatment diary below      Assessment: Tolerated treatment well  Patient would benefit from continued PT  Patent tolerates added laser well with no sig warmth  She has good tolerance to mobility today  Continue to progress core exercises as tolerate to increase lumbar strength and mobiltiy  Plan: Progress treatment as tolerated         Precautions: Hypothyroid, Osteoporosis  [facility]      Manuals  RE neck         Laser right SI joint  18W 1:30   NV  5:30 20W       Sacral mobs  6' 4' 6' 6' 3'       Lumbar A/P mobs   2' 2' 2' 2'       Piriformis release R   4'  NV          C/s stretching    6' 6' 6'        UT/levator STM   6' 6' 6' 6'       Right paraspinal trigger point release      4' 3'                                              Neuro Re-Ed             TA with single leg lower   NV 10x bl           TA with bridge  NV           TA with iso Add/abd  10x5"  10" x5 ea           TA with PPT   10x 5"  10" x5          Dead bug             Bird dogs              90/90 lifts              TB row   NV  R Tb 2x10  G TB 2x10       TB extensions   NV R TB 2x10   G TB 2x10                                                                        Ther Ex             SKC 10x5" 5x5"           LTR 10x5" 5x5"           Bridge 10x5"            Piriformis Stretch 5x30" 1'            Hamstring stretch 5x30" 20"X3           Standing 3 way hip             UT stretch   10" x 5 Levator stretch   10"x5          Door pec stretch    NV          Prone leg lifts       NV      Prone arm lifts        NV                                                                       Ther Activity             Bike  5' lvl3  NV 5' lvl 3    5' lvl 4      Open book stretch   NV 5x5" ea          Db shoulder flexion             DB scaption             Ball rolls fwd/latera    5x5"   NV      Modalities

## 2022-08-16 ENCOUNTER — OFFICE VISIT (OUTPATIENT)
Dept: PHYSICAL THERAPY | Facility: CLINIC | Age: 72
End: 2022-08-16
Payer: COMMERCIAL

## 2022-08-16 DIAGNOSIS — M54.2 NECK PAIN ON LEFT SIDE: ICD-10-CM

## 2022-08-16 DIAGNOSIS — M54.50 ACUTE RIGHT-SIDED LOW BACK PAIN WITHOUT SCIATICA: Primary | ICD-10-CM

## 2022-08-16 PROCEDURE — 97140 MANUAL THERAPY 1/> REGIONS: CPT | Performed by: PHYSICAL THERAPIST

## 2022-08-16 PROCEDURE — 97110 THERAPEUTIC EXERCISES: CPT | Performed by: PHYSICAL THERAPIST

## 2022-08-16 PROCEDURE — 97112 NEUROMUSCULAR REEDUCATION: CPT | Performed by: PHYSICAL THERAPIST

## 2022-08-16 NOTE — PROGRESS NOTES
Daily Note     Today's date: 2022  Patient name: Alivia Pacheco  : 1950  MRN: [id number]  Referring provider: Edin Redman MD  Dx:   No diagnosis found  Subjective: Patient states she  Still feels very tight in her lower back still  Objective: See treatment diary below      Assessment: Tolerated treatment well  Patient would benefit from continued PT  Patent has good tolerance to session today  She has period of spasm at right side paraspinals around T 9-10 that caused episode of pain  She has persistent spasms along right sided paraspinals  She has good tolerance to added core stability exercises today  Plan: Progress treatment as tolerated         Precautions: Hypothyroid, Osteoporosis  HEP - [facility]      Manuals  RE neck         Laser right SI joint  18W 1:30   NV  5:30 20W NV      Sacral mobs  6' 4' 6' 6' 3' 3'      Lumbar A/P mobs   2' 2' 2' 2' 2'      Piriformis release R   4'  NV          C/s stretching    6' 6' 6'  5'      UT/levator STM   6' 6' 6' 6' 5'      Right paraspinal trigger point release      4' 3' 5'                                             Neuro Re-Ed             TA with single leg lower   NV 10x bl     15x bl       TA with bridge  NV     15x       TA with iso Add/abd  10x5"  10" x5 ea           TA with PPT   10x 5"  10" x5          Dead bug             Bird dogs              90/90 lifts              TB row   NV  R Tb 2x10  G TB 2x10 G TB 20x  HEP     TB extensions   NV R TB 2x10   G TB 2x10 G TB 20x  HEP                                                                      Ther Ex             SKC 10x5" 5x5"           LTR 10x5" 5x5"           Bridge 10x5"            Piriformis Stretch 5x30" 1'            Hamstring stretch 5x30" 20"X3           Standing 3 way hip             UT stretch   10" x 5          Levator stretch   10"x5          Door pec stretch    NV          Prone leg lifts       x10 bl  2x10     Prone arm lifts NV                                                                       Ther Activity             Bike  5' lvl3  NV 5' lvl 3    5' lvl 4      Open book stretch   NV 5x5" ea          Db shoulder flexion             DB scaption             Ball rolls fwd/latera    5x5"   NV      Modalities

## 2022-08-17 ENCOUNTER — APPOINTMENT (OUTPATIENT)
Dept: PHYSICAL THERAPY | Facility: CLINIC | Age: 72
End: 2022-08-17
Payer: COMMERCIAL

## 2022-08-18 ENCOUNTER — APPOINTMENT (OUTPATIENT)
Dept: PHYSICAL THERAPY | Facility: CLINIC | Age: 72
End: 2022-08-18
Payer: COMMERCIAL

## 2022-08-18 ENCOUNTER — OFFICE VISIT (OUTPATIENT)
Dept: PHYSICAL THERAPY | Facility: CLINIC | Age: 72
End: 2022-08-18
Payer: COMMERCIAL

## 2022-08-18 DIAGNOSIS — M54.2 NECK PAIN ON LEFT SIDE: ICD-10-CM

## 2022-08-18 DIAGNOSIS — M54.50 ACUTE RIGHT-SIDED LOW BACK PAIN WITHOUT SCIATICA: Primary | ICD-10-CM

## 2022-08-18 PROCEDURE — 97110 THERAPEUTIC EXERCISES: CPT | Performed by: PHYSICAL THERAPIST

## 2022-08-18 PROCEDURE — 97112 NEUROMUSCULAR REEDUCATION: CPT | Performed by: PHYSICAL THERAPIST

## 2022-08-18 PROCEDURE — 97140 MANUAL THERAPY 1/> REGIONS: CPT | Performed by: PHYSICAL THERAPIST

## 2022-08-18 NOTE — PROGRESS NOTES
Daily Note     Today's date: 2022  Patient name: Bud Jernigan  : 1950  MRN: [id number]  Referring provider: Johnathan Mcgowan MD  Dx:   Encounter Diagnosis     ICD-10-CM    1  Acute right-sided low back pain without sciatica  M54 50    2  Neck pain on left side  M54 2                   Subjective: Patient states she felt slightly sore in her neck after last session from manuals  She still gets sore in her her back and neck with driving and if she tries to turn  Objective: See treatment diary below      Assessment: Tolerated treatment well  Patient would benefit from continued PT  Patent with improved tolerance to exercises and mobility today  No complaints of pain with exercises though has persistent stiffness  Re education provided for c/s today to improve posture and neck position  Progressed HEP  Plan: Progress treatment as tolerated         Precautions: Hypothyroid, Osteoporosis  HEP - [id number]      Manuals  RE neck       Laser right SI joint  18W 1:30   NV  5:30 20W NV      Sacral mobs  6' 4' 6' 6' 3' 3' 3'     Lumbar A/P mobs   2' 2' 2' 2' 2' 2'     Piriformis release R   4'  NV          C/s stretching    6' 6' 6'  5' 5'     UT/levator STM   6' 6' 6' 6' 5' 5'     Right paraspinal trigger point release      4' 3' 5' NV                                            Neuro Re-Ed             TA with single leg lower   NV 10x bl     15x bl  15x bl     TA with bridge  NV     15x  NV     TA with iso Add/abd  10x5"  10" x5 ea           TA with PPT   10x 5"  10" x5          Dead bug        NV     Bird dogs              90/90 lifts              TB row   NV  R Tb 2x10  G TB 2x10 G TB 20x  HEP     TB extensions   NV R TB 2x10   G TB 2x10 G TB 20x  HEP     Chin tucks        10x5"     C/s flexion lift s        10x5"                                             Ther Ex             SKC 10x5" 5x5"           LTR 10x5" 5x5"           Bridge 10x5"            Piriformis Stretch 5x30" 1'            Hamstring stretch 5x30" [id number]           Standing 3 way hip             UT stretch   10" x 5          Levator stretch   10"x5          Door pec stretch    NV          Prone leg lifts       x10 bl  x10     Prone arm lifts        NV                                                                       Ther Activity             Bike  5' lvl3  NV 5' lvl 3    5' lvl 4 5' lvl 4      Open book stretch   NV 5x5" ea          Db shoulder flexion             DB scaption             Ball rolls fwd/latera    5x5"   NV 5x5" ea      Modalities

## 2022-08-22 ENCOUNTER — OFFICE VISIT (OUTPATIENT)
Dept: PHYSICAL THERAPY | Facility: CLINIC | Age: 72
End: 2022-08-22
Payer: COMMERCIAL

## 2022-08-22 DIAGNOSIS — M54.50 ACUTE RIGHT-SIDED LOW BACK PAIN WITHOUT SCIATICA: Primary | ICD-10-CM

## 2022-08-22 DIAGNOSIS — M54.2 NECK PAIN ON LEFT SIDE: ICD-10-CM

## 2022-08-22 PROCEDURE — 97112 NEUROMUSCULAR REEDUCATION: CPT

## 2022-08-22 PROCEDURE — 97140 MANUAL THERAPY 1/> REGIONS: CPT

## 2022-08-22 PROCEDURE — 97110 THERAPEUTIC EXERCISES: CPT

## 2022-08-22 NOTE — PROGRESS NOTES
Daily Note     Today's date: 2022  Patient name: Sae Jacobs  : 1950  MRN: [id number]  Referring provider: Scott Beth MD  Dx:   Encounter Diagnosis     ICD-10-CM    1  Acute right-sided low back pain without sciatica  M54 50    2  Neck pain on left side  M54 2                   Subjective: Pt reports no adverse effects following last session  Objective: See treatment diary below      Assessment: Tolerated treatment fair  Patient would benefit from continued PT   R LE more fatigued than L w/ single leg lower  Challenged by addition of dead bug; however, no increased discomfort noted  Good tolerance to manual cervical stretching; R lumbar paraspinal trigger point still present  Plan: Progress treatment as tolerated         Precautions: Hypothyroid, Osteoporosis  HEP - [id number]      Manuals  RE neck      Laser right SI joint  18W 1:30   NV  5:30 20W NV      Sacral mobs  6' 4' 6' 6' 3' 3' 3' NV    Lumbar A/P mobs   2' 2' 2' 2' 2' 2' NV    Piriformis release R   4'  NV          C/s stretching    6' 6' 6'  5' 5' 5'    UT/levator STM   6' 6' 6' 6' 5' 5' 5'    Right lumbar paraspinal trigger point release      4' 3' 5' NV 5'                                           Neuro Re-Ed             TA with single leg lower   NV 10x bl     15x bl  15x bl 15x ea    TA with bridge  NV     15x  NV 15x    TA with iso Add/abd  10x5"  10" x5 ea           TA with PPT   10x 5"  10" x5          Dead bug        NV 10 ea    Bird dogs              90/90 lifts              TB row   NV  R Tb 2x10  G TB 2x10 G TB 20x  HEP     TB extensions   NV R TB 2x10   G TB 2x10 G TB 20x  HEP     Chin tucks        10x5" 10x5"    C/s flexion lifts        10x5"  10x5"                                           Ther Ex             SKC 10x5" 5x5"           LTR 10x5" 5x5"           Bridge 10x5"            Piriformis Stretch 5x30" 1'            Hamstring stretch 5x30" 84"A2 Standing 3 way hip             UT stretch   10" x 5          Levator stretch   10"x5          Door pec stretch    NV          Prone leg lifts       x10 bl  x10 10 ea    Prone arm lifts        NV  NV?                                                                      Ther Activity             Bike  5' lvl3  NV 5' lvl 3    5' lvl 4 5' lvl 4  5' lv 4    Open book stretch   NV 5x5" ea          Db shoulder flexion             DB scaption             Ball rolls fwd/lateral    5x5"   NV 5x5" ea  5x5" ea    Modalities                                          1:1 [id number]

## 2022-08-23 ENCOUNTER — APPOINTMENT (OUTPATIENT)
Dept: PHYSICAL THERAPY | Facility: CLINIC | Age: 72
End: 2022-08-23
Payer: COMMERCIAL

## 2022-08-24 ENCOUNTER — OFFICE VISIT (OUTPATIENT)
Dept: PHYSICAL THERAPY | Facility: CLINIC | Age: 72
End: 2022-08-24
Payer: COMMERCIAL

## 2022-08-24 DIAGNOSIS — M54.2 NECK PAIN ON LEFT SIDE: ICD-10-CM

## 2022-08-24 DIAGNOSIS — M54.50 ACUTE RIGHT-SIDED LOW BACK PAIN WITHOUT SCIATICA: Primary | ICD-10-CM

## 2022-08-24 PROCEDURE — 97140 MANUAL THERAPY 1/> REGIONS: CPT

## 2022-08-24 PROCEDURE — 97110 THERAPEUTIC EXERCISES: CPT

## 2022-08-24 NOTE — PROGRESS NOTES
Daily Note     Today's date: 2022  Patient name: Suly Engle  : 1950  MRN: [id number]  Referring provider: Ai Haley MD  Dx:   Encounter Diagnosis     ICD-10-CM    1  Acute right-sided low back pain without sciatica  M54 50    2  Neck pain on left side  M54 2                   Subjective: Patient reports increased neck pain today along SCM causing HA and facial pain  Objective: See treatment diary below      Assessment: Pt education on pillow with neck support when sleeping and maintaining neutral spine  Plan: Continue per plan of care  Precautions: Hypothyroid, Osteoporosis  [facility]      Manuals    Laser right SI joint  5:30 20W NV      Sacral mobs 6' 3' 3' 3' NV SB   Lumbar A/P mobs 2' 2' 2' 2' NV SB   Piriformis release R          C/s stretching  6'  5' 5' 5' HA   UT/levator STM 6' 6' 5' 5' 5' HA   Right lumbar paraspinal trigger point release  4' 3' 5' NV 5' HA                              Neuro Re-Ed         TA with single leg lower    15x bl  15x bl 15x ea    TA with bridge   15x  NV 15x 2x10   TA with iso Add/abd         TA with PPT       5"x20   Dead bug    NV 10 ea    Bird dogs          90/90 lifts          TB row  G TB 2x10 G TB 20x  HEP     TB extensions  G TB 2x10 G TB 20x  HEP     Chin tucks    10x5" 10x5" 10x5"   C/s flexion lifts    10x5"  10x5" 10x5"                              Ther Ex         SKC         LTR         Bridge         Piriformis Stretch         Hamstring stretch         Standing 3 way hip         UT stretch         Levator stretch         Door pec stretch          Prone leg lifts   x10 bl  x10 10 ea 10 ea   Prone arm lifts    NV  NV?     Self SCM stretch      20"x4                                       Ther Activity         Bike   5' lvl 4 5' lvl 4  5' lv 4 5' Lv 4   Open book stretch         Db shoulder flexion         DB scaption         Ball rolls fwd/lateral   NV 5x5" ea  5x5" ea    Modalities

## 2022-08-25 ENCOUNTER — APPOINTMENT (OUTPATIENT)
Dept: PHYSICAL THERAPY | Facility: CLINIC | Age: 72
End: 2022-08-25
Payer: COMMERCIAL

## 2022-08-29 ENCOUNTER — OFFICE VISIT (OUTPATIENT)
Dept: PHYSICAL THERAPY | Facility: CLINIC | Age: 72
End: 2022-08-29
Payer: COMMERCIAL

## 2022-08-29 DIAGNOSIS — M54.50 ACUTE RIGHT-SIDED LOW BACK PAIN WITHOUT SCIATICA: Primary | ICD-10-CM

## 2022-08-29 DIAGNOSIS — M54.2 NECK PAIN ON LEFT SIDE: ICD-10-CM

## 2022-08-29 PROCEDURE — 97140 MANUAL THERAPY 1/> REGIONS: CPT | Performed by: PHYSICAL THERAPIST

## 2022-08-29 PROCEDURE — 97112 NEUROMUSCULAR REEDUCATION: CPT | Performed by: PHYSICAL THERAPIST

## 2022-08-29 PROCEDURE — 97110 THERAPEUTIC EXERCISES: CPT | Performed by: PHYSICAL THERAPIST

## 2022-08-29 NOTE — PROGRESS NOTES
Daily Note     Today's date: 2022  Patient name: Daija Noriega  : 1950  MRN: [id number]  Referring provider: Gilford Lady, MD  Dx:   Encounter Diagnosis     ICD-10-CM    1  Acute right-sided low back pain without sciatica  M54 50    2  Neck pain on left side  M54 2                   Subjective: Patient reports he has less intensity of pain overall though is frustrated that pain is still lingering  She is scheduled to see Dr Leena Griffin for l/s eval        Objective: See treatment diary below      Assessment: Patient complains of persistent right paraspinal soreness though has improved tolerance to manuals  She tolerated progressed core stability exercises well today with increased fatigue reported  Progress HEP with additional core exercises NV  Plan: Continue per plan of care        Precautions: Hypothyroid, Osteoporosis  HEP - [facility] [place]      Manuals    Laser right SI joint  5:30 20W NV       Sacral mobs 6' 3' 3' 3' NV SB 3'   Lumbar A/P mobs 2' 2' 2' 2' NV SB 2'   Piriformis release R           C/s stretching  6'  5' 5' 5' HA    UT/levator STM 6' 6' 5' 5' 5' HA    Right lumbar paraspinal trigger point release  4' 3' 5' NV 5' HA 5'                                 Neuro Re-Ed          TA with single leg lower    15x bl  15x bl 15x ea     TA with bridge   15x  NV 15x 2x10    TA with iso Add/abd          TA with PPT       5"x20    Dead bug    NV 10 ea  x10 bl    Bird dogs        x5 bl    90/90 lifts        x10 bl    TB row  G TB 2x10 G TB 20x  HEP      TB extensions  G TB 2x10 G TB 20x  HEP      Chin tucks    10x5" 10x5" 10x5" NV   C/s flexion lifts    10x5"  10x5" 10x5" NV                                 Ther Ex          Standing lateral trunk flexion stretch with cane       10" x5 to left only    SKC          LTR          Bridge          Piriformis Stretch          Hamstring stretch          Standing 3 way hip          UT stretch          Levator stretch Door pec stretch           Prone leg lifts   x10 bl  x10 10 ea 10 ea x15 bl    Prone arm lifts    NV  NV?      Self SCM stretch      20"x4                                            Ther Activity          Bike   5' lvl 4 5' lvl 4  5' lv 4 5' Lv 4 5'  l 4    Open book stretch          Db shoulder flexion          DB scaption          Ball rolls fwd/lateral   NV 5x5" ea  5x5" ea     Modalities

## 2022-09-01 ENCOUNTER — OFFICE VISIT (OUTPATIENT)
Dept: PHYSICAL THERAPY | Facility: CLINIC | Age: 72
End: 2022-09-01
Payer: COMMERCIAL

## 2022-09-01 DIAGNOSIS — M54.2 NECK PAIN ON LEFT SIDE: ICD-10-CM

## 2022-09-01 DIAGNOSIS — M54.50 ACUTE RIGHT-SIDED LOW BACK PAIN WITHOUT SCIATICA: Primary | ICD-10-CM

## 2022-09-01 PROCEDURE — 97112 NEUROMUSCULAR REEDUCATION: CPT

## 2022-09-01 PROCEDURE — 97110 THERAPEUTIC EXERCISES: CPT

## 2022-09-01 PROCEDURE — 97140 MANUAL THERAPY 1/> REGIONS: CPT

## 2022-09-01 NOTE — PROGRESS NOTES
Daily Note     Today's date: 2022  Patient name: Pb Batista  : 1950  MRN: [id number]  Referring provider: Terra Beach MD  Dx:   Encounter Diagnosis     ICD-10-CM    1  Acute right-sided low back pain without sciatica  M54 50    2  Neck pain on left side  M54 2                   Subjective:  Pt c/o neck discomfort during the night and upon waking in the morning  Objective: See treatment diary below      Assessment: Tolerated treatment well  Patient exhibited good technique with therapeutic exercises and would benefit from continued PT  Pt reports if she relaxed core while performing stability exercises, R lumbar irritation occurs  Increased L UT discomfort, tightness noted w/ manual cervical stretch to R  Most challenged by 90/90 heel taps  Plan: Progress treatment as tolerated         Precautions: Hypothyroid, Osteoporosis  [facility] [place]      Manuals    Laser right SI joint          Sacral mobs NV    NV SB 3'   Lumbar A/P mobs NV    NV SB 2'   Piriformis release R           C/s stretching  5'    5' HA    UT/levator STM 5'    5' HA    Right lumbar paraspinal trigger point release  5'    5' HA 5'                                 Neuro Re-Ed          TA with single leg lower      15x ea     TA with bridge     15x 2x10    TA with iso Add/abd          TA with PPT  10x5"     5"x20    Dead bug 10 ea    10 ea  x10 bl    Bird dogs  NV      x5 bl    90/90 lifts  10 ea      x10 bl    TB row          TB extensions          Chin tucks 10x5"    10x5" 10x5" NV   C/s flexion lifts 10x5"    10x5" 10x5" NV                                 Ther Ex          Standing lateral trunk flexion stretch with cane 5x10" to L only      10" x5 to left only    SKC          LTR          Bridge          Piriformis Stretch          Hamstring stretch          Standing 3 way hip          UT stretch          Levator stretch          Door pec stretch           Prone leg lifts 15 ea    10 ea 10 ea x15 bl    Prone arm lifts      NV?      Self SCM stretch      20"x4                                            Ther Activity          Bike 5' lv 4    5' lv 4 5' Lv 4 5'  l 4    Open book stretch          Db shoulder flexion          DB scaption          Ball rolls fwd/lateral     5x5" ea     Modalities

## 2022-09-06 ENCOUNTER — OFFICE VISIT (OUTPATIENT)
Dept: PHYSICAL THERAPY | Facility: CLINIC | Age: 72
End: 2022-09-06
Payer: COMMERCIAL

## 2022-09-06 DIAGNOSIS — M54.50 ACUTE RIGHT-SIDED LOW BACK PAIN WITHOUT SCIATICA: Primary | ICD-10-CM

## 2022-09-06 DIAGNOSIS — M54.2 NECK PAIN ON LEFT SIDE: ICD-10-CM

## 2022-09-06 PROCEDURE — 97140 MANUAL THERAPY 1/> REGIONS: CPT

## 2022-09-06 PROCEDURE — 97110 THERAPEUTIC EXERCISES: CPT

## 2022-09-06 PROCEDURE — 97112 NEUROMUSCULAR REEDUCATION: CPT

## 2022-09-06 NOTE — PROGRESS NOTES
Daily Note     Today's date: 2022  Patient name: Nury Lux  : 1950  MRN: [id number]  Referring provider: Carlitos Dubon MD  Dx:   Encounter Diagnosis     ICD-10-CM    1  Acute right-sided low back pain without sciatica  M54 50    2  Neck pain on left side  M54 2                   Subjective: Pt reports no adverse effects following last session  Objective: See treatment diary below      Assessment: Tolerated treatment well  Patient would benefit from continued PT  Pt notes strain, fatigue w/ cervical flexion holds and supine 90/90 heel taps  Challenged by addition of steamboats; mild lumbar discomfort noted w/ hip abduction  Good tolerance to manual tx  Plan: Progress treatment as tolerated         Precautions: Hypothyroid, Osteoporosis  [facility]      Manuals    Laser right SI joint          Sacral mobs NV NV   NV SB 3'   Lumbar A/P mobs NV NV   NV SB 2'   Piriformis release R           C/s stretching  5' 5'   5' HA    UT/levator STM 5' 5'   5' HA    Right lumbar paraspinal trigger point release  5' 5'   5' HA 5'                                 Neuro Re-Ed          TA with single leg lower      15x ea     TA with bridge     15x 2x10    TA with iso Add/abd          TA with PPT  10x5" 10x5"    5"x20    Dead bug 10 ea 10 ea   10 ea  x10 bl    Bird dogs  NV      x5 bl    90/90 lifts  10 ea 10 ea     x10 bl    TB row  HEP        TB extensions  HEP        Chin tucks 10x5" 10x5"   10x5" 10x5" NV   C/s flexion lifts 10x5" 10x5"   10x5" 10x5" NV                                 Ther Ex          Standing lateral trunk flexion stretch with cane 5x10" to L only 5x10" to L only     10" x5 to left only    SKC          LTR          Bridge          Piriformis Stretch          Hamstring stretch          Standing 3 way hip  YTB 10 ea BL        UT stretch          Levator stretch          Door pec stretch           Prone leg lifts 15 ea 15 ea   10 ea 10 ea x15 bl    Prone arm lifts      NV?      Self SCM stretch      20"x4                                            Ther Activity          Bike 5' lv 4 5' lv 4   5' lv 4 5' Lv 4 5'  l 4    Open book stretch          Db shoulder flexion          DB scaption          Ball rolls fwd/lateral     5x5" ea     Modalities

## 2022-09-08 ENCOUNTER — OFFICE VISIT (OUTPATIENT)
Dept: OBGYN CLINIC | Facility: MEDICAL CENTER | Age: 72
End: 2022-09-08
Payer: COMMERCIAL

## 2022-09-08 ENCOUNTER — APPOINTMENT (OUTPATIENT)
Dept: RADIOLOGY | Facility: MEDICAL CENTER | Age: 72
End: 2022-09-08
Payer: COMMERCIAL

## 2022-09-08 ENCOUNTER — OFFICE VISIT (OUTPATIENT)
Dept: PHYSICAL THERAPY | Facility: CLINIC | Age: 72
End: 2022-09-08
Payer: COMMERCIAL

## 2022-09-08 VITALS
HEIGHT: 63 IN | BODY MASS INDEX: 20.73 KG/M2 | DIASTOLIC BLOOD PRESSURE: 72 MMHG | WEIGHT: 117 LBS | SYSTOLIC BLOOD PRESSURE: 120 MMHG | HEART RATE: 71 BPM
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
## 2022-09-08 DIAGNOSIS — M54.50 ACUTE BILATERAL LOW BACK PAIN WITHOUT SCIATICA: Primary | ICD-10-CM

## 2022-09-08 DIAGNOSIS — M54.50 ACUTE BILATERAL LOW BACK PAIN WITHOUT SCIATICA: ICD-10-CM

## 2022-09-08 DIAGNOSIS — M54.50 ACUTE RIGHT-SIDED LOW BACK PAIN WITHOUT SCIATICA: Primary | ICD-10-CM

## 2022-09-08 DIAGNOSIS — M54.2 NECK PAIN ON LEFT SIDE: ICD-10-CM

## 2022-09-08 PROCEDURE — 97110 THERAPEUTIC EXERCISES: CPT | Performed by: PHYSICAL THERAPIST

## 2022-09-08 PROCEDURE — 99203 OFFICE O/P NEW LOW 30 MIN: CPT | Performed by: EMERGENCY MEDICINE

## 2022-09-08 PROCEDURE — 97140 MANUAL THERAPY 1/> REGIONS: CPT | Performed by: PHYSICAL THERAPIST

## 2022-09-08 PROCEDURE — 97112 NEUROMUSCULAR REEDUCATION: CPT | Performed by: PHYSICAL THERAPIST

## 2022-09-08 PROCEDURE — 72100 X-RAY EXAM L-S SPINE 2/3 VWS: CPT
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
## 2022-09-08 NOTE — PROGRESS NOTES
Daily Note     Today's date: 2022  Patient name: Laurann Alpers  : 1950  MRN: [id number]  Referring provider: Patrice Barrios MD  Dx:   Encounter Diagnosis     ICD-10-CM    1  Acute right-sided low back pain without sciatica  M54 50    2  Neck pain on left side  M54 2                   Subjective: Pt reports  She was doing a lot of pushing/pulling and lifting over the last few days and then did a lot of stretching for piriformis and notes she was tender in her lateral right leg afterward  She is not sure if she overdid it  Objective: See treatment diary below      Assessment: Tolerated treatment well  Patient would benefit from continued PT  Worked on functional exercises and lifting today as she would like to get back into these activities as home  She has some difficulty attaining squat with lifting today, requiring cues  Continue address this  Tendnerness persists at SIJ though reports relief while working on manuals however does not report change afterward  Plan: Progress treatment as tolerated         Precautions: Hypothyroid, Osteoporosis  [facility]      Manuals    Laser right SI joint          Sacral mobs NV NV 8'  NV SB 3'   Lumbar A/P mobs NV NV   NV SB 2'   Piriformis release R           C/s stretching  5' 5'   5' HA    UT/levator STM 5' 5'   5' HA    Right lumbar paraspinal trigger point release  5' 5' 4'  5' HA 5'                                 Neuro Re-Ed          TA with single leg lower      15x ea     TA with bridge     15x 2x10    TA with iso Add/abd          TA with PPT  10x5" 10x5"    5"x20    Dead bug 10 ea 10 ea 10x ea   10 ea  x10 bl    Bird dogs  NV  NV    x5 bl    90/90 lifts  10 ea 10 ea NV    x10 bl    TB row  HEP        TB extensions   HEP 13# maulik  2x10    13# 2x10  At [employer] tucks 10x5" 10x5"   10x5" 10x5" NV   C/s flexion lifts 10x5" 10x5"   10x5" 10x5" NV   maulik TA anti rotation press    5# x10 bl Ther Ex          Standing lateral trunk flexion stretch with cane 5x10" to L only 5x10" to L only     10" x5 to left only    SKC          LTR          Bridge          Piriformis Stretch          Hamstring stretch          Standing 3 way hip  YTB 10 ea BL        UT stretch          Levator stretch          Door pec stretch           Prone leg lifts 15 ea 15 ea 2x10 bl   10 ea 10 ea x15 bl    Prone arm lifts      NV?      Self SCM stretch      20"x4                                            Ther Activity          Bike 5' lv 4 5' lv 4 5' ; vl 4   5' lv 4 5' Lv 4 5'  l 4    Open book stretch          Db shoulder flexion          DB scaption          DKTC       10x10"   Ball rolls fwd/lateral     5x5" ea     Box lifts from chair   10x box only        Reviewed [employer] for home use STM   AF

## 2022-09-08 NOTE — PATIENT INSTRUCTIONS
You may use Advil (ibuprofen) 600mg every 6 hours or at least twice per day OR Aleve (naproxen) 250-500mg every 12 hours as needed for pain and inflammation  You may also take Tylenol 500mg every 4-6 hours as needed OR max 1,000mg per dose up to 3 times per day for a total of 3,000mg per day  Check with your primary care physician to see if these medications are safe to take and to make sure they do not interfere with your other medications and medical issues  While taking the oral steroid Medrol Dose Pack, do not take any NSAIDs such as Advil, Motrin, ibuprofen, Motrin, Aleve, naproxen, Celebrex or Meloxicam   You can restart the NSAIDs after you finish the steroids  However you may take Tylenol 500mg every 4-6 hours as needed OR max 1,000mg per dose up to 3 times per day for a total of 3,000mg per day  While taking oral steroids, you may experience mild side effects such as feeling jittery or flushing  Please call if your side effects are significant or you have any questions

## 2022-09-12 ENCOUNTER — OFFICE VISIT (OUTPATIENT)
Dept: PHYSICAL THERAPY | Facility: CLINIC | Age: 72
End: 2022-09-12
Payer: COMMERCIAL

## 2022-09-12 DIAGNOSIS — M54.2 NECK PAIN ON LEFT SIDE: ICD-10-CM

## 2022-09-12 DIAGNOSIS — M54.50 ACUTE RIGHT-SIDED LOW BACK PAIN WITHOUT SCIATICA: Primary | ICD-10-CM

## 2022-09-12 PROCEDURE — 97112 NEUROMUSCULAR REEDUCATION: CPT

## 2022-09-12 PROCEDURE — 97110 THERAPEUTIC EXERCISES: CPT

## 2022-09-12 PROCEDURE — 97140 MANUAL THERAPY 1/> REGIONS: CPT

## 2022-09-12 NOTE — PROGRESS NOTES
Daily Note     Today's date: 2022  Patient name: Alyssia Dwyer  : 1950  MRN: [id number]  Referring provider: Abisai Harry MD  Dx:   Encounter Diagnosis     ICD-10-CM    1  Acute right-sided low back pain without sciatica  M54 50    2  Neck pain on left side  M54 2                   Subjective: Pt reports occasional difficulty/discomfort trying to go through boxes, move them around  Objective: See treatment diary below      Assessment: Tolerated treatment well  Patient would benefit from continued PT  Pt c/o R LE discomfort w/ anti rotation press from R; review proper core stabilization techniques w/ pt  Good form displayed w/ bird dogs  Most challenged by 90/90 heel taps  TTP w/ lumbar PA mobes  Plan: Progress treatment as tolerated         Precautions: Hypothyroid, Osteoporosis  [facility]      Manuals    Laser right SI joint          Sacral mobs NV NV 8'   SB 3'   Lumbar P/A mobs NV NV  SPT, BH  SB 2'   Piriformis release R           C/s stretching  5' 5'    HA    UT/levator STM 5' 5'    HA    Right lumbar paraspinal trigger point release  5' 5' 4' 8'  HA 5'                                 Neuro Re-Ed          TA with single leg lower           TA with bridge      2x10    TA with iso Add/abd          TA with PPT  10x5" 10x5"    5"x20    Dead bug 10 ea 10 ea 10x ea  10 ea   x10 bl    Bird dogs  NV  NV 10 ea   x5 bl    90/90 lifts  10 ea 10 ea NV 10 ea   x10 bl    TB row  HEP        TB extensions   HEP 13# maulik  2x10 maulik 13# 2x10   13# 2x10  At [employer] tucks 10x5" 10x5"    10x5" NV   C/s flexion lifts 10x5" 10x5"    10x5" NV   maulik TA anti rotation press    5# x10 bl  5# 10 ea                          Ther Ex          Standing lateral trunk flexion stretch with cane 5x10" to L only 5x10" to L only     10" x5 to left only    SKC          LTR          Bridge          Piriformis Stretch          Hamstring stretch          Standing 3 way hip YTB 10 ea BL        UT stretch          Levator stretch          Door pec stretch           Prone leg lifts 15 ea 15 ea 2x10 bl  2x10 ea  10 ea x15 bl    Prone arm lifts           Self SCM stretch      20"x4                                            Ther Activity          Bike 5' lv 4 5' lv 4 5' ; vl 4  5' lv 4  5' Lv 4 5'  l 4    Open book stretch          Db shoulder flexion          DB scaption          DKTC       10x10"   Ball rolls fwd/lateral          Box lifts from chair   10x box only  NV      Reviewed theracane for home use STM   AF

## 2022-09-15 ENCOUNTER — EVALUATION (OUTPATIENT)
Dept: PHYSICAL THERAPY | Facility: CLINIC | Age: 72
End: 2022-09-15
Payer: COMMERCIAL

## 2022-09-15 DIAGNOSIS — M54.50 ACUTE RIGHT-SIDED LOW BACK PAIN WITHOUT SCIATICA: Primary | ICD-10-CM

## 2022-09-15 DIAGNOSIS — M54.2 NECK PAIN ON LEFT SIDE: ICD-10-CM

## 2022-09-15 PROCEDURE — 97530 THERAPEUTIC ACTIVITIES: CPT | Performed by: PHYSICAL THERAPIST

## 2022-09-15 PROCEDURE — 97112 NEUROMUSCULAR REEDUCATION: CPT | Performed by: PHYSICAL THERAPIST

## 2022-09-15 PROCEDURE — 97140 MANUAL THERAPY 1/> REGIONS: CPT | Performed by: PHYSICAL THERAPIST
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
## 2022-09-19 ENCOUNTER — OFFICE VISIT (OUTPATIENT)
Dept: PHYSICAL THERAPY | Facility: CLINIC | Age: 72
End: 2022-09-19
Payer: COMMERCIAL

## 2022-09-19 DIAGNOSIS — M54.50 ACUTE RIGHT-SIDED LOW BACK PAIN WITHOUT SCIATICA: Primary | ICD-10-CM

## 2022-09-19 DIAGNOSIS — M54.2 NECK PAIN ON LEFT SIDE: ICD-10-CM

## 2022-09-19 PROCEDURE — 97140 MANUAL THERAPY 1/> REGIONS: CPT | Performed by: PHYSICAL THERAPIST

## 2022-09-19 PROCEDURE — 97112 NEUROMUSCULAR REEDUCATION: CPT | Performed by: PHYSICAL THERAPIST

## 2022-09-19 PROCEDURE — 97110 THERAPEUTIC EXERCISES: CPT | Performed by: PHYSICAL THERAPIST

## 2022-09-19 NOTE — PROGRESS NOTES
Daily Note     Today's date: 2022  Patient name: Suly Engle  : 1950  MRN: [id number]  Referring provider: Ai Haley MD  Dx:   Encounter Diagnosis     ICD-10-CM    1  Acute right-sided low back pain without sciatica  M54 50    2  Neck pain on left side  M54 2                   Subjective: Pt states she is feeling pretty good  She still feels stiff first thing in the morning but as she moves around more she feels better  Objective: See treatment diary below      Assessment: Tolerated treatment well  Patient would benefit from continued PT  Patient reports abdominal fatigue today with added exercises  She does tend to valsalva and tighten c/s when performing TA exercises with occasional cues needed  She has no increased pain with exercises, reports tenderness in right paraspinals with manuals  No increased pain reported       Plan: Progress treatment as tolerated         Precautions: Hypothyroid, Osteoporosis  HEP - [id number]      Manuals 9/1 9/6 9/8 9/12 9/15 RE    Laser right SI joint          Sacral mobs NV NV 8'  5' 5' 3'   Lumbar P/A mobs NV NV  SPT, BH 5' 5' 2'   Piriformis release R           C/s stretching  5' 5'   5'     UT/levator STM 5' 5'   5'     Right lumbar paraspinal trigger point release  5' 5' 4' 8'  4' 5'                                 Neuro Re-Ed          TA with single leg lower       x10 bl    TA with bridge          TA with iso Add/abd          TA with MB lifts       x15 yellow     TA with PPT   10x5" 10x5"        Dead bug 10 ea 10 ea 10x ea  10 ea   x10 bl    Bird dogs  NV  NV 10 ea   x5 bl    90/90 lifts  10 ea 10 ea NV 10 ea  10x ea  x10 bl    TB row  HEP        TB extensions   HEP 13# maulik  2x10 maulik 13# 2x10  maulik 15# 2x10  13# 2x10  At [employer] downward chops       7# x10 bl     maulik upward chops       NV    Chin tucks 10x5" 10x5"    10x5" NV   C/s flexion lifts 10x5" 10x5"    10x5" NV   maulik TA anti rotation press    5# x10 bl  5# 10 ea  5# x10 bl                         Ther Ex          Standing lateral trunk flexion stretch with cane 5x10" to L only 5x10" to L only     10" x5 to left only    SKC          LTR          Bridge          Piriformis Stretch          Hamstring stretch          Standing 3 way hip  YTB 10 ea BL        UT stretch          Levator stretch          Door pec stretch           Prone leg lifts 15 ea 15 ea 2x10 bl  2x10 ea  10 ea x15 bl    Prone arm lifts           Self SCM stretch      20"x4                                            Ther Activity          Bike 5' lv 4 5' lv 4 5' ; vl 4  5' lv 4 NV 5' Lv 4 5'  l 4    Open book stretch          Db shoulder flexion          DB scaption          DKTC       10x10"   Ball rolls fwd/lateral          Box lifts from chair   10x box only  NV      Reviewed theracane for home use STM   AF

## 2022-09-22 ENCOUNTER — OFFICE VISIT (OUTPATIENT)
Dept: PHYSICAL THERAPY | Facility: CLINIC | Age: 72
End: 2022-09-22
Payer: COMMERCIAL

## 2022-09-22 DIAGNOSIS — M54.2 NECK PAIN ON LEFT SIDE: ICD-10-CM

## 2022-09-22 DIAGNOSIS — M54.50 ACUTE RIGHT-SIDED LOW BACK PAIN WITHOUT SCIATICA: Primary | ICD-10-CM

## 2022-09-22 PROCEDURE — 97110 THERAPEUTIC EXERCISES: CPT | Performed by: PHYSICAL THERAPIST

## 2022-09-22 PROCEDURE — 97140 MANUAL THERAPY 1/> REGIONS: CPT | Performed by: PHYSICAL THERAPIST

## 2022-09-22 PROCEDURE — 97112 NEUROMUSCULAR REEDUCATION: CPT | Performed by: PHYSICAL THERAPIST

## 2022-09-22 NOTE — PROGRESS NOTES
Daily Note     Today's date: 2022  Patient name: Bud Jernigan  : 1950  MRN: [id number]  Referring provider: Johnathan Mcgowan MD  Dx:   Encounter Diagnosis     ICD-10-CM    1  Acute right-sided low back pain without sciatica  M54 50    2  Neck pain on left side  M54 2                   Subjective: Pt states she woke up Tuesday morning she thinks she slept wrong and the left side of her neck feels more locked  She has pain and difficulty side bending and left rotating in upper trap area and upper c/s on left  Objective: See treatment diary below      Assessment: Tolerated treatment well  Patient would benefit from continued PT  Patient with decreased segmental mobility with downgliding from c3-c6  She has soreness with passive left rotation and SBing though tolerates  Pt has no difficulty with core stability exercises today  She reports improved neck pain and improved active mobility post session  Discussed to continue stretching left side (opening)facets until next visit and will assess  Plan: Progress treatment as tolerated         Precautions: Hypothyroid, Osteoporosis  HEP - [id number]      Manuals 9/1 9/6 9/8 9/12 9/15 RE    Laser right SI joint          Sacral mobs NV NV 8'  5' 5'    Lumbar P/A mobs NV NV  SPT, BH 5' 5'    Piriformis release R           C/s stretching  5' 5'   5'  5'   UT/levator STM 5' 5'   5'  5'   Right lumbar paraspinal trigger point release  5' 5' 4' 8'  4'    C3-6 left side  gapping and  left passive rotation        10'                       Neuro Re-Ed          TA with single leg lower       x10 bl    TA with bridge          TA with iso Add/abd          TA with MB lifts       x15 yellow  x10 yellow    TA with PPT   10x5" 10x5"        TA with BKFO       x10 bl    Dead bug 10 ea 10 ea 10x ea  10 ea   x10 bl    Bird dogs  NV  NV 10 ea   x10 bl   90/90 lifts  10 ea 10 ea NV 10 ea  10x ea     TB row  HEP        TB extensions   HEP 13# maulik  2x10 maulik 13# 2x10  maulik 15# 2x10      maulik downward chops       7# x10 bl     maulik upward chops       NV    Chin tucks 10x5" 10x5"    10x5"    C/s flexion lifts 10x5" 10x5"    10x5"    maulik TA anti rotation press    5# x10 bl  5# 10 ea  5# x10 bl                         Ther Ex          UBE       2'/2'   Standing lateral trunk flexion stretch with cane 5x10" to L only 5x10" to L only        SKC          LTR          Bridge          Piriformis Stretch          Hamstring stretch          Standing 3 way hip  YTB 10 ea BL        UT stretch          Levator stretch          Door pec stretch           Prone leg lifts 15 ea 15 ea 2x10 bl  2x10 ea  10 ea x15 bl    Prone arm lifts           Self SCM stretch      20"x4                                            Ther Activity          Bike 5' lv 4 5' lv 4 5' ; vl 4  5' lv 4 NV 5' Lv 4 5'  l 4    Open book stretch          Db shoulder flexion          DB scaption          DKTC          Ball rolls fwd/lateral          Box lifts from chair   10x box only  NV      Reviewed theracane for home use STM   AF

## 2022-09-26 ENCOUNTER — OFFICE VISIT (OUTPATIENT)
Dept: PHYSICAL THERAPY | Facility: CLINIC | Age: 72
End: 2022-09-26
Payer: COMMERCIAL

## 2022-09-26 DIAGNOSIS — M54.50 ACUTE RIGHT-SIDED LOW BACK PAIN WITHOUT SCIATICA: Primary | ICD-10-CM

## 2022-09-26 DIAGNOSIS — M54.2 NECK PAIN ON LEFT SIDE: ICD-10-CM

## 2022-09-26 PROCEDURE — 97110 THERAPEUTIC EXERCISES: CPT

## 2022-09-26 PROCEDURE — 97112 NEUROMUSCULAR REEDUCATION: CPT

## 2022-09-26 PROCEDURE — 97140 MANUAL THERAPY 1/> REGIONS: CPT

## 2022-09-26 NOTE — PROGRESS NOTES
Daily Note     Today's date: 2022  Patient name: Logan Bucio  : 1950  MRN: [id number]  Referring provider: Fam Nelson MD  Dx:   Encounter Diagnosis     ICD-10-CM    1  Acute right-sided low back pain without sciatica  M54 50    2  Neck pain on left side  M54 2                   Subjective: Pt reports her neck feels "much better" than last week  Objective: See treatment diary below      Assessment: Tolerated treatment well  Patient exhibited good technique with therapeutic exercises and would benefit from continued PT   Reduced wt on maulik exercises due to shdr discomfort last visit  Good tolerance to manual cervical stretching/STM; continued TTP R SI, lumbar paraspinals  Plan: Progress treatment as tolerated         Precautions: Hypothyroid, Osteoporosis  HEP - ZFVAAPX6      Manuals 9/26    9/15 RE    Laser right SI joint          Sacral mobs     5' 5'    Lumbar P/A mobs     5' 5'    Piriformis release R           C/s stretching  6'    5'  5'   UT/levator STM 6'    5'  5'   Right lumbar paraspinal trigger point release  5'     4'    C3-6 left side  gapping and  left passive rotation  NV      10'                       Neuro Re-Ed          TA with single leg lower       x10 bl    TA with bridge          TA with iso Add/abd          TA with MB lifts  Yellow 2x10     x15 yellow  x10 yellow    TA with PPT            TA with BKFO 10 ea      x10 bl    Dead bug 10 ea   10 ea   x10 bl    Bird dogs  10 ea   10 ea   x10 bl   90/90 lifts     10 ea  10x ea     TB row          TB extensions  maulik 13# 2x10   maulik 13# 2x10  maulik 15# 2x10      maulik downward chops       7# x10 bl     maulik upward chops       NV    Chin tucks      10x5"    C/s flexion lifts      10x5"    maulik TA anti rotation press  5# 10 ea   5# 10 ea  5# x10 bl                         Ther Ex          UBE 2'/2'      2'/2'   Standing lateral trunk flexion stretch with cane          SKC          LTR          Bridge Piriformis Stretch          Hamstring stretch          Standing 3 way hip          UT stretch          Levator stretch          Door pec stretch           Prone leg lifts 2x10 ea   2x10 ea  10 ea x15 bl    Prone arm lifts           Self SCM stretch      20"x4                                            Ther Activity          Bike 5' lv 4   5' lv 4 NV 5' Lv 4 5'  l 4    Open book stretch          Db shoulder flexion          DB scaption          DKTC          Ball rolls fwd/lateral          Box lifts from chair    NV      Reviewed theracane for home use STM

## 2022-09-29 ENCOUNTER — OFFICE VISIT (OUTPATIENT)
Dept: PHYSICAL THERAPY | Facility: CLINIC | Age: 72
End: 2022-09-29
Payer: COMMERCIAL

## 2022-09-29 DIAGNOSIS — M54.50 ACUTE RIGHT-SIDED LOW BACK PAIN WITHOUT SCIATICA: Primary | ICD-10-CM

## 2022-09-29 DIAGNOSIS — M54.2 NECK PAIN ON LEFT SIDE: ICD-10-CM

## 2022-09-29 PROCEDURE — 97140 MANUAL THERAPY 1/> REGIONS: CPT | Performed by: PHYSICAL THERAPIST

## 2022-09-29 PROCEDURE — 97110 THERAPEUTIC EXERCISES: CPT | Performed by: PHYSICAL THERAPIST

## 2022-09-29 PROCEDURE — 97112 NEUROMUSCULAR REEDUCATION: CPT | Performed by: PHYSICAL THERAPIST

## 2022-09-29 NOTE — PROGRESS NOTES
Daily Note     Today's date: 2022  Patient name: Daija Noriega  : 1950  MRN: [id number]  Referring provider: Gilford Lady, MD  Dx:   Encounter Diagnosis     ICD-10-CM    1  Acute right-sided low back pain without sciatica  M54 50    2  Neck pain on left side  M54 2                   Subjective: Pt reports her back will bother her a bit still when she does more around the house  She is much more conscious about her posture and what she is supposed to do for her core  Objective: See treatment diary below      Assessment: Tolerated treatment well  Patient exhibited good technique with therapeutic exercises and would benefit from continued PT  Patient tolerates session well  Occasional cues to maintain TA draw with bird dogs  Good form noted with exercises  She continues to be most challenged by 90/90 leg lifts  She has no significant pain or tightness in c/s today with manuals and min tenderness in l/s or sacrum today  Plan: Progress treatment as tolerated         Precautions: Hypothyroid, Osteoporosis  [facility]      Manuals 9/26 9/29   9/15 RE    Laser right SI joint          Sacral mobs  5'   5' 5'    Lumbar P/A mobs  5'   5' 5'    Piriformis release R           C/s stretching  6' 3'   5'  5'   UT/levator STM 6' 3'   5'  5'   Right lumbar paraspinal trigger point release  5'     4'    C3-6 left side  gapping and  left passive rotation  NV      10'                       Neuro Re-Ed          TA with single leg lower       x10 bl    TA with bridge          TA with iso Add/abd          TA with MB lifts  Yellow 2x10 Yellow 2x10     x15 yellow  x10 yellow    TA with PPT            TA with BKFO 10 ea      x10 bl    Dead bug 10 ea NV  10 ea   x10 bl    Bird dogs  10 ea 2x10 ea   10 ea   x10 bl   90/90 lifts   x10  10 ea  10x ea     TB row          TB extensions  maulik 13# 2x10 maulik 15#   2x10   maulik 13# 2x10  maulik 15# 2x10   k   maulik downward chops       7# x10 bl maulik upward chops       NV    Chin tucks      10x5"    C/s flexion lifts      10x5"    maulik TA anti rotation press  5# 10 ea 5# x15 bl   5# 10 ea  5# x10 bl                         Ther Ex          UBE 2'/2' 2/'2'     2'/2'   Standing lateral trunk flexion stretch with cane          SKC          LTR          Bridge          Piriformis Stretch          Hamstring stretch          Standing 3 way hip          UT stretch          Levator stretch          Door pec stretch           Prone leg lifts 2x10 ea   2x10 ea  10 ea x15 bl    Prone arm lifts           Self SCM stretch      20"x4                                            Ther Activity          Bike 5' lv 4   5' lv 4 NV 5' Lv 4 5'  l 4    Open book stretch          Db shoulder flexion          DB scaption          DKTC          Ball rolls fwd/lateral          Box lifts from chair    NV      Reviewed theracane for home use STM

## 2022-10-03 ENCOUNTER — OFFICE VISIT (OUTPATIENT)
Dept: PHYSICAL THERAPY | Facility: CLINIC | Age: 72
End: 2022-10-03
Payer: COMMERCIAL

## 2022-10-03 DIAGNOSIS — M54.2 NECK PAIN ON LEFT SIDE: ICD-10-CM

## 2022-10-03 DIAGNOSIS — M54.50 ACUTE RIGHT-SIDED LOW BACK PAIN WITHOUT SCIATICA: Primary | ICD-10-CM

## 2022-10-03 PROCEDURE — 97112 NEUROMUSCULAR REEDUCATION: CPT

## 2022-10-03 PROCEDURE — 97110 THERAPEUTIC EXERCISES: CPT

## 2022-10-03 PROCEDURE — 97140 MANUAL THERAPY 1/> REGIONS: CPT

## 2022-10-03 NOTE — PROGRESS NOTES
Daily Note     Today's date: 10/3/2022  Patient name: Mark Adam  : 1950  MRN: [id number]  Referring provider: Brian Sanchez MD  Dx:   Encounter Diagnosis     ICD-10-CM    1  Acute right-sided low back pain without sciatica  M54 50    2  Neck pain on left side  M54 2                   Subjective: Pt reports no adverse effects following last tx  Objective: See treatment diary below      Assessment: Tolerated treatment well  Patient would benefit from continued PT  Fatigued by [employer] activities, mostly due to UE weakness  Challenged by bird dogs due to core fatigue  Continued TTP of R L/S area; no increased sx noted w/ manual cervical stretching  Plan: Progress treatment as tolerated         Precautions: Hypothyroid, Osteoporosis  [facility]      Manuals 9/26 9/29 10/3  9/15 RE    Laser right SI joint          Sacral mobs  5' NV  5' 5'    Lumbar P/A mobs  5' NV  5' 5'    Piriformis release R           C/s stretching  6' 3' 5'  5'  5'   UT/levator STM 6' 3' 5'  5'  5'   Right lumbar paraspinal trigger point release  5'  5'   4'    C3-6 left side  gapping and  left passive rotation  NV      10'                       Neuro Re-Ed          TA with single leg lower       x10 bl    TA with bridge          TA with iso Add/abd          TA with MB lifts  Yellow 2x10 Yellow 2x10  Yellow 2x10   x15 yellow  x10 yellow    TA with PPT            TA with BKFO 10 ea      x10 bl    Dead bug 10 ea NV 10 ea    x10 bl    Bird dogs  10 ea 2x10 ea  2x10 ea    x10 bl   90/90 lifts   x10 x10 ea   10x ea     TB row          TB extensions  maulik 13# 2x10 maulik 15#   2x10  maulik 15# 2x10   maulik 15# 2x10   k   maulik downward chops       7# x10 bl     maulik upward chops       NV    Chin tucks      10x5"    C/s flexion lifts      10x5"    maulik TA anti rotation press  5# 10 ea 5# x15 bl  5# 15 ea   5# x10 bl                         Ther Ex          UBE 2'/2' 2/'2' 2'/2'    2'/2'   Standing lateral trunk flexion stretch with cane          SKC          LTR          Bridge          Piriformis Stretch          Hamstring stretch          Standing 3 way hip          UT stretch          Levator stretch          Door pec stretch           Prone leg lifts 2x10 ea     10 ea x15 bl    Prone arm lifts           Self SCM stretch      20"x4                                            Ther Activity          Bike 5' lv 4  5' lv 4  NV 5' Lv 4 5'  l 4    Open book stretch          Db shoulder flexion          DB scaption          DKTC          Ball rolls fwd/lateral          Box lifts from chair          Reviewed [employer] for home use [facility]                        1:1 [id number]

## 2022-10-06 ENCOUNTER — OFFICE VISIT (OUTPATIENT)
Dept: PHYSICAL THERAPY | Facility: CLINIC | Age: 72
End: 2022-10-06
Payer: COMMERCIAL

## 2022-10-06 DIAGNOSIS — M54.50 ACUTE RIGHT-SIDED LOW BACK PAIN WITHOUT SCIATICA: Primary | ICD-10-CM

## 2022-10-06 DIAGNOSIS — M54.2 NECK PAIN ON LEFT SIDE: ICD-10-CM

## 2022-10-06 PROCEDURE — 97140 MANUAL THERAPY 1/> REGIONS: CPT | Performed by: PHYSICAL THERAPIST

## 2022-10-06 PROCEDURE — 97110 THERAPEUTIC EXERCISES: CPT | Performed by: PHYSICAL THERAPIST

## 2022-10-06 PROCEDURE — 97112 NEUROMUSCULAR REEDUCATION: CPT | Performed by: PHYSICAL THERAPIST
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
## 2022-10-10 ENCOUNTER — OFFICE VISIT (OUTPATIENT)
Dept: PHYSICAL THERAPY | Facility: CLINIC | Age: 72
End: 2022-10-10
Payer: COMMERCIAL

## 2022-10-10 DIAGNOSIS — M54.2 NECK PAIN ON LEFT SIDE: ICD-10-CM

## 2022-10-10 DIAGNOSIS — M54.50 ACUTE RIGHT-SIDED LOW BACK PAIN WITHOUT SCIATICA: Primary | ICD-10-CM

## 2022-10-10 PROCEDURE — 97112 NEUROMUSCULAR REEDUCATION: CPT

## 2022-10-10 PROCEDURE — 97110 THERAPEUTIC EXERCISES: CPT

## 2022-10-10 PROCEDURE — 97140 MANUAL THERAPY 1/> REGIONS: CPT

## 2022-10-10 NOTE — PROGRESS NOTES
Daily Note     Today's date: 10/10/2022  Patient name: Sae Jacobs  : 1950  MRN: [id number]  Referring provider: Scott Beth MD  Dx:   Encounter Diagnosis     ICD-10-CM    1  Acute right-sided low back pain without sciatica  M54 50    2  Neck pain on left side  M54 2                   Subjective: Pt reports no adverse effects following last visit  Objective: See treatment diary below      Assessment: Tolerated treatment well  Patient exhibited good technique with therapeutic exercises and would benefit from continued PT   Challenged, fatigued by [employer] extensions today  Good tolerance to manual cervical stretching/STM; mild TTP of R lumbar paraspinals persist     Plan: Progress treatment as tolerated         Precautions: Hypothyroid, Osteoporosis  HEP - ZFVAAPX6    10/6 RE  Manuals 9/26 9/29 10/3 10/6  10/10     Laser right SI joint          Sacral mobs  5' NV       Lumbar P/A mobs  5' NV 5' NV     Piriformis release R           C/s stretching  6' 3' 5' 5' 5'     UT/levator STM 6' 3' 5' 5'  5'     Right lumbar paraspinal trigger point release  5'  5'  5'     C3-6 left side  gapping and  left passive rotation  NV                             Neuro Re-Ed          TA with single leg lower           TA with bridge          TA with iso Add/abd          TA with MB lifts  Yellow 2x10 Yellow 2x10  Yellow 2x10       TA with PPT            TA with BKFO 10 ea         Dead bug 10 ea NV 10 ea 10x  Ea  10 ea     Bird dogs  10 ea 2x10 ea  2x10 ea x10  10 ea     90/90 lifts   x10 x10 ea 10x  10 ea     TB row          TB extensions  maulik 13# 2x10 maulik 15#   2x10  maulik 15# 2x10 16# 2x10  16# 2x10     maulik downward chops           maulik upward chops           Chin tucks          C/s flexion lifts          maulik TA anti rotation press  5# 10 ea 5# x15 bl  5# 15 ea 5# 15x ea  5# 15 ea                         Ther Ex          UBE 2'/2' 2/'2' 2'/2'  2'/2'     Standing lateral trunk flexion stretch with cane          SKC          LTR          Bridge          Piriformis Stretch          Hamstring stretch          Standing 3 way hip          UT stretch          Levator stretch          Door pec stretch           Prone leg lifts 2x10 ea         Prone arm lifts           Self SCM stretch                                                  Ther Activity          Bike 5' lv 4  5' lv 4 5' lvl4  5' lv 4     Open book stretch          Db shoulder flexion          DB scaption          DKTC          Ball rolls fwd/lateral          Box lifts from chair          Reviewed the[employer]ane for home use [facility]                       1:1 [phone]

## 2022-10-12 ENCOUNTER — APPOINTMENT (OUTPATIENT)
Dept: RADIOLOGY | Facility: MEDICAL CENTER | Age: 72
End: 2022-10-12
Payer: COMMERCIAL

## 2022-10-12 ENCOUNTER — OFFICE VISIT (OUTPATIENT)
Dept: OBGYN CLINIC | Facility: MEDICAL CENTER | Age: 72
End: 2022-10-12
Payer: COMMERCIAL

## 2022-10-12 VITALS
DIASTOLIC BLOOD PRESSURE: 71 MMHG | WEIGHT: 117.4 LBS | SYSTOLIC BLOOD PRESSURE: 110 MMHG | HEART RATE: 73 BPM | HEIGHT: 63 IN | BODY MASS INDEX: 20.8 KG/M2

## 2022-10-12 DIAGNOSIS — G89.29 CHRONIC BILATERAL LOW BACK PAIN WITHOUT SCIATICA: ICD-10-CM

## 2022-10-12 DIAGNOSIS — M54.50 CHRONIC BILATERAL LOW BACK PAIN WITHOUT SCIATICA: Primary | ICD-10-CM
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
## 2022-10-12 DIAGNOSIS — M54.50 CHRONIC BILATERAL LOW BACK PAIN WITHOUT SCIATICA: ICD-10-CM

## 2022-10-12 DIAGNOSIS — M50.30 DEGENERATIVE CERVICAL DISC: ICD-10-CM

## 2022-10-12 DIAGNOSIS — M54.2 NECK PAIN: ICD-10-CM

## 2022-10-12 DIAGNOSIS — G89.29 CHRONIC BILATERAL LOW BACK PAIN WITHOUT SCIATICA: Primary | ICD-10-CM

## 2022-10-12 PROCEDURE — 99214 OFFICE O/P EST MOD 30 MIN: CPT | Performed by: EMERGENCY MEDICINE

## 2022-10-12 PROCEDURE — 72040 X-RAY EXAM NECK SPINE 2-3 VW: CPT

## 2022-10-12 RX ORDER — METHYLPREDNISOLONE 4 MG/1
TABLET ORAL
Qty: 1 EACH | Refills: 0 | Status: SHIPPED | OUTPATIENT
Start: 2022-10-12 | End: 2022-10-28 | Stop reason: ALTCHOICE
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
## 2022-10-13 ENCOUNTER — OFFICE VISIT (OUTPATIENT)
Dept: PHYSICAL THERAPY | Facility: CLINIC | Age: 72
End: 2022-10-13
Payer: COMMERCIAL

## 2022-10-13 DIAGNOSIS — M54.50 ACUTE RIGHT-SIDED LOW BACK PAIN WITHOUT SCIATICA: Primary | ICD-10-CM

## 2022-10-13 DIAGNOSIS — M54.2 NECK PAIN ON LEFT SIDE: ICD-10-CM

## 2022-10-13 PROCEDURE — 97112 NEUROMUSCULAR REEDUCATION: CPT

## 2022-10-13 PROCEDURE — 97140 MANUAL THERAPY 1/> REGIONS: CPT

## 2022-10-13 PROCEDURE — 97110 THERAPEUTIC EXERCISES: CPT

## 2022-10-13 NOTE — PROGRESS NOTES
Daily Note     Today's date: 10/13/2022  Patient name: Alivia Pacheco  : 1950  MRN: [id number]  Referring provider: Edin Redman MD  Dx:   Encounter Diagnosis     ICD-10-CM    1  Acute right-sided low back pain without sciatica  M54 50    2  Neck pain on left side  M54 2                   Subjective: Patient reports on L sided LBP today  Objective: See treatment diary below      Assessment: Tolerated treatment well  Relief post tx  Plan: Continue per plan of care        Precautions: Hypothyroid, Osteoporosis  HEP - ZFVAAPX6    10/6 RE  Manuals 9/26 9/29 10/3 10/6  10/10 10/13    Laser right SI joint          Sacral mobs  5' NV       Lumbar P/A mobs  5' NV 5' NV     Piriformis release R           C/s stretching  6' 3' 5' 5' 5' HA    UT/levator STM 6' 3' 5' 5'  5' HA    Right lumbar paraspinal trigger point release  5'  5'  5' HA    C3-6 left side  gapping and  left passive rotation  NV                             Neuro Re-Ed          TA with single leg lower           TA with bridge          TA with iso Add/abd          TA with MB lifts  Yellow 2x10 Yellow 2x10  Yellow 2x10       TA with PPT            TA with BKFO 10 ea         Dead bug 10 ea NV 10 ea 10x  Ea  10 ea 10 ea    Bird dogs  10 ea 2x10 ea  2x10 ea x10  10 ea 10 ea    90/90 lifts   x10 x10 ea 10x  10 ea 10 ea    TB row          TB extensions  maulik 13# 2x10 maulik 15#   2x10  maulik 15# 2x10 16# 2x10  16# 2x10 16# 2x10    maulik downward chops           maulik upward chops           Chin tucks          C/s flexion lifts          maulik TA anti rotation press  5# 10 ea 5# x15 bl  5# 15 ea 5# 15x ea  5# 15 ea 5# 15 ea                        Ther Ex          UBE 2'/2' 2/'2' 2'/2'  2'/2' 2'/2'    Standing lateral trunk flexion stretch with cane          SKC          LTR          Bridge          Piriformis Stretch          Hamstring stretch          Standing 3 way hip          UT stretch          Levator stretch          Door pec stretch           Prone leg lifts 2x10 ea         Prone arm lifts           Self SCM stretch                                                  Ther Activity          Bike 5' lv 4  5' lv 4 5' lvl4  5' lv 4 5' lvl 4    Open book stretch          Db shoulder flexion          DB scaption          DKTC          Ball rolls fwd/lateral          Box lifts from chair          Reviewed theracane for home use STM

## 2022-10-17 ENCOUNTER — EVALUATION (OUTPATIENT)
Dept: PHYSICAL THERAPY | Facility: CLINIC | Age: 72
End: 2022-10-17
Payer: COMMERCIAL

## 2022-10-17 DIAGNOSIS — M54.50 ACUTE RIGHT-SIDED LOW BACK PAIN WITHOUT SCIATICA: Primary | ICD-10-CM

## 2022-10-17 DIAGNOSIS — M54.2 NECK PAIN ON LEFT SIDE: ICD-10-CM

## 2022-10-17 PROCEDURE — 97110 THERAPEUTIC EXERCISES: CPT | Performed by: PHYSICAL THERAPIST

## 2022-10-17 PROCEDURE — 97140 MANUAL THERAPY 1/> REGIONS: CPT | Performed by: PHYSICAL THERAPIST

## 2022-10-17 PROCEDURE — 97530 THERAPEUTIC ACTIVITIES: CPT | Performed by: PHYSICAL THERAPIST
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
## 2022-10-24 ENCOUNTER — OFFICE VISIT (OUTPATIENT)
Dept: PHYSICAL THERAPY | Facility: CLINIC | Age: 72
End: 2022-10-24
Payer: COMMERCIAL

## 2022-10-24 DIAGNOSIS — M54.50 ACUTE RIGHT-SIDED LOW BACK PAIN WITHOUT SCIATICA: Primary | ICD-10-CM

## 2022-10-24 DIAGNOSIS — M54.2 NECK PAIN ON LEFT SIDE: ICD-10-CM

## 2022-10-24 PROCEDURE — 97112 NEUROMUSCULAR REEDUCATION: CPT

## 2022-10-24 PROCEDURE — 97140 MANUAL THERAPY 1/> REGIONS: CPT

## 2022-10-24 PROCEDURE — 97110 THERAPEUTIC EXERCISES: CPT

## 2022-10-24 NOTE — PROGRESS NOTES
Daily Note     Today's date: 10/24/2022  Patient name: Yemi Bird  : 1950  MRN: [id number]  Referring provider: Stew Sauer MD  Dx:   Encounter Diagnosis     ICD-10-CM    1  Acute right-sided low back pain without sciatica  M54 50    2  Neck pain on left side  M54 2                   Subjective: Pt states her back has been "much better", and her neck has improved also  Pt reports HA, neck discomfort yesterday, but notes it is better today  Objective: See treatment diary below      Assessment: Tolerated treatment well  Patient exhibited good technique with therapeutic exercises and would benefit from continued PT  Challenged by addition of 1 5lbs to BL LE w/ table core exercises  No increased discomfort noted w/ manual cervical stretching  Mild TTP of R paraspinals noted  Plan: Progress treatment as tolerated         Precautions: Hypothyroid, Osteoporosis  [facility]    Manuals 9/26 9/29 10/3 10/6  10/10 10/13 10/17 RE 10/24   Laser right SI joint           Sacral mobs  5' NV        Lumbar P/A mobs  5' NV 5' NV  5'    Piriformis release R            C/s stretching  6' 3' 5' 5' 5' HA 5' 5'   UT/levator STM 6' 3' 5' 5'  5' HA 5' 5'     Right lumbar paraspinal trigger point release  5'  5'  5' HA  5'   C3-6 left side  gapping and  left passive rotation  NV                                Neuro Re-Ed           TA with single leg lower            TA with bridge           TA with iso Add/abd           TA with MB lifts  Yellow 2x10 Yellow 2x10  Yellow 2x10        TA with PPT             TA with BKFO 10 ea          Dead bug 10 ea NV 10 ea 10x  Ea  10 ea 10 ea  1 5# 10 ea   Bird dogs  10 ea 2x10 ea  2x10 ea x10  10 ea 10 ea  1 5# 10 ea   90/90 lifts   x10 x10 ea 10x  10 ea 10 ea  1 5# 10 ea   TB row           TB extensions  maulik 13# 2x10 maulik 15#   2x10  maulik 15# 2x10 16# 2x10  16# 2x10 16# 2x10  16# 2x10   maulik downward chops            maulik upward chops            Chin tucks 10" x10 10" x10   C/s flexion lifts           maulik TA anti rotation press  5# 10 ea 5# x15 bl  5# 15 ea 5# 15x ea  5# 15 ea 5# 15 ea  5# 15 ea                         Ther Ex           UBE 2'/2' 2/'2' 2'/2'  2'/2' 2'/2'  2'/2'   Standing lateral trunk flexion stretch with cane           SKC           LTR           Bridge           Piriformis Stretch           Hamstring stretch           Standing 3 way hip           UT stretch           Levator stretch           Door pec stretch            Prone leg lifts 2x10 ea          Prone arm lifts            Self SCM stretch                                                       Ther Activity           Bike 5' lv 4  5' lv 4 5' lvl4  5' lv 4 5' lvl 4 5 ' lvl 4  5' lv 4   Open book stretch           Db shoulder flexion           DB scaption           DKTC           Ball rolls fwd/lateral           Box lifts from chair           Reviewed theracane for home use STM

## 2022-10-28 ENCOUNTER — TELEPHONE (OUTPATIENT)
Dept: FAMILY MEDICINE CLINIC | Facility: CLINIC | Age: 72
End: 2022-10-28

## 2022-10-28 ENCOUNTER — OFFICE VISIT (OUTPATIENT)
Dept: FAMILY MEDICINE CLINIC | Facility: CLINIC | Age: 72
End: 2022-10-28

## 2022-10-28 VITALS
BODY MASS INDEX: 20.87 KG/M2 | OXYGEN SATURATION: 98 % | HEART RATE: 84 BPM | HEIGHT: 63 IN | WEIGHT: 117.8 LBS | TEMPERATURE: 97.3 F

## 2022-10-28 DIAGNOSIS — H61.21 IMPACTED CERUMEN OF RIGHT EAR: ICD-10-CM

## 2022-10-28 DIAGNOSIS — H93.A9 PULSATILE TINNITUS: ICD-10-CM

## 2022-10-28 DIAGNOSIS — H69.81 DYSFUNCTION OF RIGHT EUSTACHIAN TUBE: Primary | ICD-10-CM

## 2022-10-28 RX ORDER — FLUTICASONE PROPIONATE 50 MCG
2 SPRAY, SUSPENSION (ML) NASAL DAILY
Qty: 11.1 ML | Refills: 2 | Status: SHIPPED | OUTPATIENT
Start: 2022-10-28

## 2022-10-28 NOTE — PROGRESS NOTES
Name: Scherry Boast      : 1950      MRN: [id number]  Encounter Provider: Fabi Shen MD  Encounter Date: 10/28/2022   Encounter department: [address]     1  Dysfunction of right eustachian tube  -     fluticasone (FLONASE) 50 mcg/act nasal spray; 2 sprays into each nostril daily 2 sprays bilat qd    2  Pulsatile tinnitus    3  Impacted cerumen of right ear  Discussion:  Reviewed with patient  Slightly decreased hearing and pulsatile tinnitus may represent eustachian tube dysfunction  Patient does have cerumen impaction which was unable to be removed  No bruit appreciated on exam   Recommend here wax softening drops as directed, and Flonase, 2 puffs bilaterally q a m   follow-up 2-3 weeks for recheck as well as re-attempted cerumen removal   Patient call for problems concerns in the interim       Subjective     72-year-old female presents with a five-day history of pulsatile tinnitus in the right ear  Patient notes some as well as some crackling when she pulls on her earlobe  She has had a dry cough but no significant nasal congestion  She denies ear drainage  She also denies change in vision, asymmetric weakness/numbness or other neuro symptoms    Review of Systems   Constitutional: Negative  HENT: Positive for hearing loss (? sl decreased on R?) and tinnitus (?pulsatile)  Negative for congestion and ear pain  Eyes: Negative  Neurological: Negative          Past Medical History:   Diagnosis Date   • Age-related osteoporosis without current pathological fracture    • Hypothyroidism due to Hashimoto's thyroiditis      Past Surgical History:   Procedure Laterality Date   • ORIF WRIST FRACTURE       Family History   Problem Relation Age of Onset   • Cancer Mother    • Lung cancer Father    • Diabetes Maternal Grandmother      Social History     Socioeconomic History   • Marital status: /Civil Union     Spouse name: Florinda Marina "Blair Mireles"   • Number of children: 5   • Years of education: Nursing school   • Highest education level: None   Occupational History   • None   Tobacco Use   • Smoking status: Never Smoker   • Smokeless tobacco: Never Used   Vaping Use   • Vaping Use: Never used   Substance and Sexual Activity   • Alcohol use: Yes     Comment: social, <1/month   • Drug use: Never   • Sexual activity: Yes     Partners: Male   Other Topics Concern   • None   Social History Narrative   • None     Social Determinants of Health     Financial Resource Strain: Not on file   Food Insecurity: Not on file   Transportation Needs: Not on file   Physical Activity: Not on file   Stress: Not on file   Social Connections: Not on file   Intimate Partner Violence: Not on file   Housing Stability: Not on file     Current Outpatient Medications on File Prior to Visit   Medication Sig   • levothyroxine 75 mcg tablet Take 1 tablet (75 mcg total) by mouth daily     No Known Allergies  Immunization History   Administered Date(s) Administered   • Tdap 09/06/2013       Objective     Pulse 84   Temp (!) 97 3 °F (36 3 °C)   Ht 5' 3" (1 6 m)   Wt 53 4 kg (117 lb 12 8 oz)   SpO2 98%   BMI 20 87 kg/m²       Physical Exam  Vitals reviewed  HENT:      Head: Normocephalic  Right Ear: There is impacted cerumen  Left Ear: Tympanic membrane, ear canal and external ear normal       Nose: No congestion  Mouth/Throat:      Mouth: Mucous membranes are moist    Eyes:      Extraocular Movements: Extraocular movements intact  Conjunctiva/sclera: Conjunctivae normal       Pupils: Pupils are equal, round, and reactive to light  Neck:      Vascular: No carotid bruit  Musculoskeletal:      Cervical back: No tenderness  Lymphadenopathy:      Cervical: No cervical adenopathy  Neurological:      General: No focal deficit present  Mental Status: She is alert and oriented to person, place, and time           Duane Endow, MD

## 2022-11-01 ENCOUNTER — APPOINTMENT (OUTPATIENT)
Dept: PHYSICAL THERAPY | Facility: CLINIC | Age: 72
End: 2022-11-01

## 2022-11-02 ENCOUNTER — OFFICE VISIT (OUTPATIENT)
Dept: PHYSICAL THERAPY | Facility: CLINIC | Age: 72
End: 2022-11-02

## 2022-11-02 DIAGNOSIS — M54.2 NECK PAIN ON LEFT SIDE: ICD-10-CM

## 2022-11-02 DIAGNOSIS — M54.50 ACUTE RIGHT-SIDED LOW BACK PAIN WITHOUT SCIATICA: Primary | ICD-10-CM

## 2022-11-02 NOTE — PROGRESS NOTES
Daily Note     Today's date: 2022  Patient name: Cara Vázquez  : 1950  MRN: [id number]  Referring provider: Abby Rene MD  Dx:   Encounter Diagnosis     ICD-10-CM    1  Acute right-sided low back pain without sciatica  M54 50    2  Neck pain on left side  M54 2                   Subjective: Pt states she is doing well today  Reports increased thoracic discomfort the other day; pt feels it may have been due to the chair she was sitting in        Objective: See treatment diary below      Assessment: Tolerated treatment well  Patient exhibited good technique with therapeutic exercises and would benefit from continued PT  Mild tenderness noted w/ palpation of R lumbar paraspinals  Good tolerance to manual cervical stretching  Most challenged by supine heel taps  Plan: Progress treatment as tolerated         Precautions: Hypothyroid, Osteoporosis  [facility] - [id number]    Manuals 11/2    10/10 10/13 10/17 RE 10/24   Laser right SI joint           Sacral mobs           Lumbar P/A mobs     NV  5'    Piriformis release R            C/s stretching  5'    5' HA 5' 5'   UT/levator STM 5'    5' HA 5' 5'     Right lumbar paraspinal trigger point release  5'    5' HA  5'   C3-6 left side  gapping and  left passive rotation                                  Neuro Re-Ed           TA with single leg lower            TA with bridge           TA with iso Add/abd           TA with MB lifts            TA with PPT             TA with BKFO           Dead bug 1 5# 10 ea    10 ea 10 ea  1 5# 10 ea   Bird dogs  1 5# 10 ea    10 ea 10 ea  1 5# 10 ea   90/90 lifts  1 5# 10 ea    10 ea 10 ea  1 5# 10 ea   TB row           TB extensions  maulik 16# 2x10    16# 2x10 16# 2x10  16# 2x10   maulik downward chops            maulik upward chops            Chin tucks 10x10"      10" x10 10" x10   C/s flexion lifts           maulik TA anti rotation press  5# 15 ea    5# 15 ea 5# 15 ea  5# 15 ea                         Ther Ex UBE 2'/2'    2'/2' 2'/2'  2'/2'   Standing lateral trunk flexion stretch with cane           SKC           LTR           Bridge           Piriformis Stretch           Hamstring stretch           Standing 3 way hip           UT stretch           Levator stretch           Door pec stretch            Prone leg lifts           Prone arm lifts            Self SCM stretch                                                       Ther Activity           Bike 5' lv 4    5' lv 4 5' lvl 4 5 ' lvl 4  5' lv 4   Open book stretch           Db shoulder flexion           DB scaption           DKTC           Ball rolls fwd/lateral           Box lifts from chair           Reviewed theracane for home use STM

## 2022-11-05 ENCOUNTER — HOSPITAL ENCOUNTER (OUTPATIENT)
Dept: MRI IMAGING | Facility: HOSPITAL | Age: 72
Discharge: HOME | End: 2022-11-05
Attending: EMERGENCY MEDICINE
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
## 2022-11-05 DIAGNOSIS — M54.50 CHRONIC BILATERAL LOW BACK PAIN WITHOUT SCIATICA: ICD-10-CM

## 2022-11-05 DIAGNOSIS — M54.2 NECK PAIN: ICD-10-CM

## 2022-11-05 DIAGNOSIS — G89.29 CHRONIC BILATERAL LOW BACK PAIN WITHOUT SCIATICA: ICD-10-CM

## 2022-11-08 ENCOUNTER — APPOINTMENT (OUTPATIENT)
Dept: PHYSICAL THERAPY | Facility: CLINIC | Age: 72
End: 2022-11-08

## 2022-11-09 ENCOUNTER — OFFICE VISIT (OUTPATIENT)
Dept: PHYSICAL THERAPY | Facility: CLINIC | Age: 72
End: 2022-11-09

## 2022-11-09 ENCOUNTER — OFFICE VISIT (OUTPATIENT)
Dept: FAMILY MEDICINE CLINIC | Facility: CLINIC | Age: 72
End: 2022-11-09

## 2022-11-09 VITALS
SYSTOLIC BLOOD PRESSURE: 120 MMHG | DIASTOLIC BLOOD PRESSURE: 84 MMHG | WEIGHT: 117.2 LBS | BODY MASS INDEX: 20.77 KG/M2 | TEMPERATURE: 97.5 F | HEIGHT: 63 IN

## 2022-11-09 DIAGNOSIS — H61.21 HEARING LOSS OF RIGHT EAR DUE TO CERUMEN IMPACTION: Primary | ICD-10-CM

## 2022-11-09 DIAGNOSIS — M54.50 ACUTE RIGHT-SIDED LOW BACK PAIN WITHOUT SCIATICA: Primary | ICD-10-CM

## 2022-11-09 DIAGNOSIS — H61.891 IRRITATION OF RIGHT EXTERNAL AUDITORY CANAL: ICD-10-CM

## 2022-11-09 DIAGNOSIS — M54.2 NECK PAIN ON LEFT SIDE: ICD-10-CM

## 2022-11-09 RX ORDER — OFLOXACIN 3 MG/ML
10 SOLUTION AURICULAR (OTIC) 2 TIMES DAILY
Qty: 5 ML | Refills: 1 | Status: SHIPPED | OUTPATIENT
Start: 2022-11-09

## 2022-11-09 NOTE — PROGRESS NOTES
Daily Note     Today's date: 2022  Patient name: Norman Delong  : 1950  MRN: [id number]  Referring provider: Marie Dutton MD  Dx:   Encounter Diagnosis     ICD-10-CM    1  Acute right-sided low back pain without sciatica  M54 50    2  Neck pain on left side  M54 2                   Subjective: Pt states she has an appt w/ MD tomorrow to review cervcial MRI  Pt notes overall neck and back have been much better since beginning PT  Objective: See treatment diary below      Assessment: Tolerated treatment well  Patient exhibited good technique with therapeutic exercises and would benefit from continued PT  Reviewed shdr extension and anti rotation press w/ TB for home use; pt given updated copy of HEP today as well  Mild R lumbar paraspinal soreness persists  Most challenged by supine heel taps and anti rotation press, although no lumbar strain noted  Plan: Probable DC to HEP       Precautions: Hypothyroid, Osteoporosis  [place] [facility] [place]uals 11/2 11/9   10/10 10/13 10/17 RE 10/24   Laser right SI joint           Sacral mobs           Lumbar P/A mobs     NV  5'    Piriformis release R            C/s stretching  5' 5'   5' HA 5' 5'   UT/levator STM 5' 5'   5' HA 5' 5'     Right lumbar paraspinal trigger point release  5' 5'   5' HA  5'   C3-6 left side  gapping and  left passive rotation                                  Neuro Re-Ed           TA with single leg lower            TA with bridge           TA with iso Add/abd           TA with MB lifts            TA with PPT             TA with BKFO           Dead bug 1 5# 10 ea 2# 10 ea   10 ea 10 ea  1 5# 10 ea   Bird dogs  1 5# 10 ea 2# 10 ea   10 ea 10 ea  1 5# 10 ea   90/90 lifts  1 5# 10 ea 2# 10 ea   10 ea 10 ea  1 5# 10 ea   TB row           TB extensions  maulik 16# 2x10 GTB 2x10   16# 2x10 16# 2x10  16# 2x10   maulik downward chops            maulik upward chops            Chin tucks 10x10"      10" x10 10" x10   C/s flexion lifts           maulik TA anti rotation press  5# 15 ea GTB 2x10   5# 15 ea 5# 15 ea  5# 15 ea                         Ther Ex           UBE 2'/2' 2'/2'   2'/2' 2'/2'  2'/2'   Standing lateral trunk flexion stretch with cane           SKC           LTR           Bridge           Piriformis Stretch           Hamstring stretch           Standing 3 way hip           UT stretch           Levator stretch           Door pec stretch            Prone leg lifts           Prone arm lifts            Self SCM stretch                                                       Ther Activity           Bike 5' lv 4 5' lv 4   5' lv 4 5' lvl 4 5 ' lvl 4  5' lv 4   Open book stretch           Db shoulder flexion           DB scaption           DKTC           Ball rolls fwd/lateral           Box lifts from chair           Reviewed theracane for home use STM

## 2022-11-10 ENCOUNTER — OFFICE VISIT (OUTPATIENT)
Dept: OBGYN CLINIC | Facility: MEDICAL CENTER | Age: 72
End: 2022-11-10

## 2022-11-10 VITALS
BODY MASS INDEX: 20.73 KG/M2 | HEART RATE: 71 BPM | WEIGHT: 117 LBS | DIASTOLIC BLOOD PRESSURE: 68 MMHG | HEIGHT: 63 IN | SYSTOLIC BLOOD PRESSURE: 108 MMHG

## 2022-11-10 DIAGNOSIS — M54.50 CHRONIC BILATERAL LOW BACK PAIN WITHOUT SCIATICA: ICD-10-CM

## 2022-11-10 DIAGNOSIS — M50.30 DEGENERATIVE CERVICAL DISC: ICD-10-CM

## 2022-11-10 DIAGNOSIS — M54.2 NECK PAIN: Primary | ICD-10-CM
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
## 2022-11-10 DIAGNOSIS — G89.29 CHRONIC BILATERAL LOW BACK PAIN WITHOUT SCIATICA: ICD-10-CM

## 2022-11-10 DIAGNOSIS — M48.02 FORAMINAL STENOSIS OF CERVICAL REGION: ICD-10-CM

## 2022-11-10 DIAGNOSIS — M43.12 SPONDYLOLISTHESIS, CERVICAL REGION: ICD-10-CM
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
## 2022-11-12 NOTE — PROGRESS NOTES
Name: Gerber Sebastian      : 1950      MRN: [id number]  Encounter Provider: Alejandra Alexis MD  Encounter Date: 2022   Encounter department: [address] [place] [address]     1  Hearing loss of right ear due to cerumen impaction  -     Ear cerumen removal    2  Irritation of right external auditory canal  -     ofloxacin (FLOXIN) 0 3 % otic solution; Administer 10 drops to the right ear 2 (two) times a day     Discussion:  I reviewed with patient  Some irritation of the left lateral wall with some mild bleeding after cerumen removal   I will start patient on Floxin otic suspension drops for 4-5 days  Discussed prophylaxis going forward with ear wax softening drops once a week  Recheck at next scheduled visit  Earlier if patient becomes symptomatic  Patient call for problems concerns in the interim        Subjective     Patient here for follow-up of right cerumen impaction  Patient seen 2 weeks ago-cerumen removal was unsuccessful  Since then, patient has been using earwax softening drops on a daily basis  She states that her previous symptoms have improved though have not completely resolved  Patient will like to have wax removed  Review of Systems   Constitutional: Negative  HENT: Positive for hearing loss (improved but not resolved) and tinnitus (resolved)  Negative for congestion and ear pain  Eyes: Negative  Neurological: Negative          Past Medical History:   Diagnosis Date   • Age-related osteoporosis without current pathological fracture    • Hypothyroidism due to Hashimoto's thyroiditis      Past Surgical History:   Procedure Laterality Date   • ORIF WRIST FRACTURE       Family History   Problem Relation Age of Onset   • Cancer Mother    • Lung cancer Father    • Diabetes Maternal Grandmother      Social History     Socioeconomic History   • Marital status: /Civil Union     Spouse name: Dali Isidro"   • Number of children: 5 • Years of education: Nursing school   • Highest education level: None   Occupational History   • None   Tobacco Use   • Smoking status: Never Smoker   • Smokeless tobacco: Never Used   Vaping Use   • Vaping Use: Never used   Substance and Sexual Activity   • Alcohol use: Yes     Comment: social, <1/month   • Drug use: Never   • Sexual activity: Yes     Partners: Male   Other Topics Concern   • None   Social History Narrative   • None     Social Determinants of Health     Financial Resource Strain: Not on file   Food Insecurity: Not on file   Transportation Needs: Not on file   Physical Activity: Not on file   Stress: Not on file   Social Connections: Not on file   Intimate Partner Violence: Not on file   Housing Stability: Not on file     Current Outpatient Medications on File Prior to Visit   Medication Sig   • levothyroxine 75 mcg tablet Take 1 tablet (75 mcg total) by mouth daily   • fluticasone (FLONASE) 50 mcg/act nasal spray 2 sprays into each nostril daily 2 sprays bilat qd (Patient not taking: No sig reported)     Allergies   Allergen Reactions   • Cats Claw (Uncaria Tomentosa) Wheezing   • Dust Mite Extract Cough   • Mixed Feathers Wheezing     Immunization History   Administered Date(s) Administered   • Tdap 09/06/2013       Objective     /84   Temp 97 5 °F (36 4 °C)   Ht 5' 3" (1 6 m)   Wt 53 2 kg (117 lb 3 2 oz)   BMI 20 76 kg/m²     Physical Exam  Vitals reviewed  HENT:      Head: Normocephalic  Right Ear: There is impacted cerumen  Left Ear: Tympanic membrane, ear canal and external ear normal       Nose: Nose normal       Mouth/Throat:      Mouth: Mucous membranes are moist    Neurological:      Mental Status: She is alert  Ear cerumen removal    Date/Time: 11/9/2022 1:40 PM  Performed by: Dell Apley, MD  Authorized by: Dell Apley, MD   Universal Protocol:  Consent: Verbal consent obtained    Consent given by: patient  Patient understanding: patient states understanding of the procedure being performed  Patient identity confirmed: verbally with patient      Patient location:  Clinic  Procedure details:     Local anesthetic:  None    Location:  R ear    Procedure type: irrigation with instrumentation      Instrumentation: forceps      Approach:  Natural orifice    Equipment used:  Irrigation, forceps  Post-procedure details:     Complication:  Bleeding    Hearing quality:  Improved    Patient tolerance of procedure: Tolerated well, no immediate complications  Comments:      Cerumen removed though irritation and some bleeding of the left lateral canal noted  TM appears within normal limits  Patient states hearing improved          Cristy Yeager MD

## 2022-12-06 ENCOUNTER — HOSPITAL ENCOUNTER (OUTPATIENT)
Dept: MAMMOGRAPHY | Facility: HOSPITAL | Age: 72
Discharge: HOME | End: 2022-12-06

## 2022-12-06 VITALS — BODY MASS INDEX: 20.74 KG/M2 | WEIGHT: 117.06 LBS | HEIGHT: 63 IN

## 2022-12-06 DIAGNOSIS — Z12.31 ENCOUNTER FOR SCREENING MAMMOGRAM FOR BREAST CANCER: ICD-10-CM

## 2023-01-12 ENCOUNTER — HOSPITAL ENCOUNTER (OUTPATIENT)
Dept: ULTRASOUND IMAGING | Facility: CLINIC | Age: 73
Discharge: HOME | End: 2023-01-12

## 2023-01-12 ENCOUNTER — HOSPITAL ENCOUNTER (OUTPATIENT)
Dept: MAMMOGRAPHY | Facility: CLINIC | Age: 73
Discharge: HOME | End: 2023-01-12

## 2023-01-12 VITALS — WEIGHT: 117 LBS | HEIGHT: 63 IN | BODY MASS INDEX: 20.73 KG/M2

## 2023-01-12 DIAGNOSIS — R92.8 ABNORMAL SCREENING MAMMOGRAM: ICD-10-CM

## 2023-01-30 DIAGNOSIS — E06.3 HYPOTHYROIDISM DUE TO HASHIMOTO'S THYROIDITIS: ICD-10-CM

## 2023-01-30 DIAGNOSIS — E03.8 HYPOTHYROIDISM DUE TO HASHIMOTO'S THYROIDITIS: ICD-10-CM

## 2023-01-30 RX ORDER — LEVOTHYROXINE SODIUM 0.07 MG/1
75 TABLET ORAL DAILY
Qty: 90 TABLET | Refills: 1 | Status: SHIPPED | OUTPATIENT
Start: 2023-01-30

## 2023-04-26 NOTE — ASSESSMENT & PLAN NOTE
Patient appears to be euthyroid  Check TSH  Continue present medication for now    Adjust dose of TSH not at goal   Recheck 6 months SUBJECTIVE:   Ricardo is a 78 [facility]; reprinted with permission ([email or web address]). Austin Paul, PharmD as Pharmacist (Pharmacist)  Love Ivy NP as Assigned Behavioral Health Provider  Teodoro Salter MD as Assigned PCP  GambinoFreddy purdy Teodoro Salter MD  [facility]    Identified Health Risks:    I have reviewed Opioid Use Disorder and Substance Use Disorder risk factors and made any needed referrals.

## 2023-05-30 ENCOUNTER — APPOINTMENT (OUTPATIENT)
Dept: LAB | Facility: CLINIC | Age: 73
End: 2023-05-30

## 2023-05-30 ENCOUNTER — TELEPHONE (OUTPATIENT)
Dept: ADMINISTRATIVE | Facility: OTHER | Age: 73
End: 2023-05-30

## 2023-05-30 ENCOUNTER — OFFICE VISIT (OUTPATIENT)
Dept: FAMILY MEDICINE CLINIC | Facility: CLINIC | Age: 73
End: 2023-05-30

## 2023-05-30 VITALS
SYSTOLIC BLOOD PRESSURE: 110 MMHG | BODY MASS INDEX: 20.98 KG/M2 | DIASTOLIC BLOOD PRESSURE: 74 MMHG | OXYGEN SATURATION: 97 % | HEIGHT: 63 IN | HEART RATE: 64 BPM | WEIGHT: 118.4 LBS | TEMPERATURE: 97.5 F

## 2023-05-30 DIAGNOSIS — Z13.6 SCREENING FOR CARDIOVASCULAR, RESPIRATORY, AND GENITOURINARY DISEASES: ICD-10-CM

## 2023-05-30 DIAGNOSIS — Z12.11 SCREEN FOR COLON CANCER: ICD-10-CM

## 2023-05-30 DIAGNOSIS — R53.83 FATIGUE, UNSPECIFIED TYPE: ICD-10-CM

## 2023-05-30 DIAGNOSIS — E03.8 HYPOTHYROIDISM DUE TO HASHIMOTO'S THYROIDITIS: ICD-10-CM

## 2023-05-30 DIAGNOSIS — Z13.83 SCREENING FOR CARDIOVASCULAR, RESPIRATORY, AND GENITOURINARY DISEASES: ICD-10-CM

## 2023-05-30 DIAGNOSIS — Z13.89 SCREENING FOR CARDIOVASCULAR, RESPIRATORY, AND GENITOURINARY DISEASES: ICD-10-CM

## 2023-05-30 DIAGNOSIS — E06.3 HYPOTHYROIDISM DUE TO HASHIMOTO'S THYROIDITIS: ICD-10-CM

## 2023-05-30 DIAGNOSIS — M81.0 AGE-RELATED OSTEOPOROSIS WITHOUT CURRENT PATHOLOGICAL FRACTURE: ICD-10-CM

## 2023-05-30 DIAGNOSIS — Z00.00 ANNUAL PHYSICAL EXAM: Primary | ICD-10-CM

## 2023-05-30 LAB
ALBUMIN SERPL BCP-MCNC: 3.6 G/DL (ref 3.5–5)
ALP SERPL-CCNC: 107 U/L (ref 46–116)
ALT SERPL W P-5'-P-CCNC: 22 U/L (ref 12–78)
ANION GAP SERPL CALCULATED.3IONS-SCNC: -1 MMOL/L (ref 4–13)
AST SERPL W P-5'-P-CCNC: 23 U/L (ref 5–45)
BASOPHILS # BLD AUTO: 0.07 THOUSANDS/ÂΜL (ref 0–0.1)
BASOPHILS NFR BLD AUTO: 1 % (ref 0–1)
BILIRUB SERPL-MCNC: 0.5 MG/DL (ref 0.2–1)
BUN SERPL-MCNC: 18 MG/DL (ref 5–25)
CALCIUM SERPL-MCNC: 9.6 MG/DL (ref 8.3–10.1)
CHLORIDE SERPL-SCNC: 111 MMOL/L (ref 96–108)
CHOLEST SERPL-MCNC: 192 MG/DL
CO2 SERPL-SCNC: 29 MMOL/L (ref 21–32)
CREAT SERPL-MCNC: 0.77 MG/DL (ref 0.6–1.3)
EOSINOPHIL # BLD AUTO: 0.16 THOUSAND/ÂΜL (ref 0–0.61)
EOSINOPHIL NFR BLD AUTO: 3 % (ref 0–6)
ERYTHROCYTE [DISTWIDTH] IN BLOOD BY AUTOMATED COUNT: 12.7 % (ref 11.6–15.1)
GFR SERPL CREATININE-BSD FRML MDRD: 78 ML/MIN/1.73SQ M
GLUCOSE P FAST SERPL-MCNC: 96 MG/DL (ref 65–99)
HCT VFR BLD AUTO: 39.4 % (ref 34.8–46.1)
HDLC SERPL-MCNC: 67 MG/DL
HGB BLD-MCNC: 12.9 G/DL (ref 11.5–15.4)
IMM GRANULOCYTES # BLD AUTO: 0.01 THOUSAND/UL (ref 0–0.2)
IMM GRANULOCYTES NFR BLD AUTO: 0 % (ref 0–2)
LDLC SERPL CALC-MCNC: 110 MG/DL (ref 0–100)
LYMPHOCYTES # BLD AUTO: 2.51 THOUSANDS/ÂΜL (ref 0.6–4.47)
LYMPHOCYTES NFR BLD AUTO: 39 % (ref 14–44)
MCH RBC QN AUTO: 31.4 PG (ref 26.8–34.3)
MCHC RBC AUTO-ENTMCNC: 32.7 G/DL (ref 31.4–37.4)
MCV RBC AUTO: 96 FL (ref 82–98)
MONOCYTES # BLD AUTO: 0.44 THOUSAND/ÂΜL (ref 0.17–1.22)
MONOCYTES NFR BLD AUTO: 7 % (ref 4–12)
NEUTROPHILS # BLD AUTO: 3.26 THOUSANDS/ÂΜL (ref 1.85–7.62)
NEUTS SEG NFR BLD AUTO: 50 % (ref 43–75)
NONHDLC SERPL-MCNC: 125 MG/DL
NRBC BLD AUTO-RTO: 0 /100 WBCS
PLATELET # BLD AUTO: 270 THOUSANDS/UL (ref 149–390)
PMV BLD AUTO: 10.9 FL (ref 8.9–12.7)
POTASSIUM SERPL-SCNC: 5.5 MMOL/L (ref 3.5–5.3)
PROT SERPL-MCNC: 7.2 G/DL (ref 6.4–8.4)
RBC # BLD AUTO: 4.11 MILLION/UL (ref 3.81–5.12)
SODIUM SERPL-SCNC: 139 MMOL/L (ref 135–147)
TRIGL SERPL-MCNC: 77 MG/DL
TSH SERPL DL<=0.05 MIU/L-ACNC: 3.71 UIU/ML (ref 0.45–4.5)
WBC # BLD AUTO: 6.45 THOUSAND/UL (ref 4.31–10.16)

## 2023-05-30 NOTE — TELEPHONE ENCOUNTER
----- Message from Nora Griffin, [address] [place] sent at 5/30/2023  8:19 AM EDT -----  Regarding: care gap request  05/30/23 8:19 AM    Hello, our patient attached above has had CRC: Colonoscopy completed/performed  Please assist in updating the patient chart by making an External outreach to Dr Thomas Barragan facility located in [place]  The date of service is 2017      Thank you,  Nora Griffin  [facility]

## 2023-05-30 NOTE — LETTER
Procedure Request Form: Colonoscopy      Date Requested: 23  Patient: Kandice Ohara  Patient : 1950   Referring Provider: Mattie Wade MD        Date of Procedure ______________________________       The above patient has informed us that they have completed their   most recent Colonoscopy at your facility  Please complete   this form and attach all corresponding procedure reports/results  Comments __________________________________________________________  ____________________________________________________________________  ____________________________________________________________________  ____________________________________________________________________    Facility Completing Procedure _________________________________________    Form Completed By (print name) _______________________________________      Signature __________________________________________________________      These reports are needed for  compliance  Please fax this completed form and a copy of the procedure report to our office located at [place] [address] as soon as possible to Fax [phone] eloisa Otero: Phone [phone]    We thank you for your assistance in treating our mutual patient

## 2023-05-30 NOTE — PROGRESS NOTES
[address]    NAME: Og Harris  AGE: 81 y o  SEX: female  : 1950     DATE: 2023     Assessment and Plan:     Problem List Items Addressed This Visit        Endocrine    Hypothyroidism     Appears to be stable clinically  Check TSH  Adjust meds if TSH is not at goal  Recheck 6m           Relevant Orders    TSH, 3rd generation       Musculoskeletal and Integument    Age related osteoporosis     Pt could not tolerate bisphosphonate  Continue Vit D and calcium  Recheck dexa  Consider Prolia if still in the osteoporotic range  Recheck 6m         Relevant Orders    DXA bone density spine hip and pelvis       Other    Fatigue     Unclear cause  Exam unremarkable  Check labs         Relevant Orders    CBC and differential    TSH, 3rd generation   Other Visit Diagnoses     Annual physical exam    -  Primary    Screen for colon cancer        Relevant Orders    Ambulatory referral for colonoscopy    Screening for cardiovascular, respiratory, and genitourinary diseases        Relevant Orders    Comprehensive metabolic panel    Lipid panel          Immunizations and preventive care screenings were discussed with patient today  Appropriate education was printed on patient's after visit summary  Counseling:  Exercise: the importance of regular exercise/physical activity was discussed  Recommend exercise 3-5 times per week for at least 30 minutes  Return in about 1 year (around 2024)  Chief Complaint:     Chief Complaint   Patient presents with   • Annual Exam   • Leg Problem     Left lower leg lump, not tender  • Rib Pain     Left sided-injured during exercise 2 weeks ago  • Fatigue      History of Present Illness:     Adult Annual Physical   Patient here for a comprehensive physical exam  The patient reports no problems  Diet and Physical Activity  Diet/Nutrition: well balanced diet     Exercise: "walking, moderate cardiovascular exercise, 5-7 times a week on average and 30-60 minutes on average  Depression Screening  PHQ-2/9 Depression Screening    Little interest or pleasure in doing things: 0 - not at all  Feeling down, depressed, or hopeless: 0 - not at all  PHQ-2 Score: 0  PHQ-2 Interpretation: Negative depression screen       General Health  Sleep: sleeps well and gets 7-8 hours of sleep on average  Hearing: normal - bilateral   Vision: no vision problems and most recent eye exam >1 year ago  Dental: regular dental visits and brushes teeth twice daily  /GYN Health  Patient is: postmenopausal  Last menstrual period: early 46s       Review of Systems:     Review of Systems   Constitutional: Positive for fatigue  Negative for activity change, appetite change, chills and fever  HENT: Negative  Eyes: Negative  Respiratory: Negative  Cardiovascular: Negative  Gastrointestinal: Negative  Endocrine: Negative  Genitourinary: Negative  Musculoskeletal: Negative  Skin: Negative  Allergic/Immunologic: Negative  Neurological: Negative  Hematological: Negative  Psychiatric/Behavioral: Negative         Past Medical History:     Past Medical History:   Diagnosis Date   • Age-related osteoporosis without current pathological fracture    • Hypothyroidism due to Hashimoto's thyroiditis       Past Surgical History:     Past Surgical History:   Procedure Laterality Date   • BREAST CYST ASPIRATION Left    • ORIF WRIST FRACTURE        Social History:     Social History     Socioeconomic History   • Marital status: /Civil Union     Spouse name: Oscar Nova"   • Number of children: 5   • Years of education: Nursing school   • Highest education level: None   Occupational History   • None   Tobacco Use   • Smoking status: Never   • Smokeless tobacco: Never   Vaping Use   • Vaping Use: Never used   Substance and Sexual Activity   • Alcohol use: Yes     Comment: social, " "<1/month   • Drug use: Never   • Sexual activity: Yes     Partners: Male   Other Topics Concern   • None   Social History Narrative   • None     Social Determinants of Health     Financial Resource Strain: Not on file   Food Insecurity: Not on file   Transportation Needs: Not on file   Physical Activity: Not on file   Stress: Not on file   Social Connections: Not on file   Intimate Partner Violence: Not on file   Housing Stability: Not on file      Family History:     Family History   Problem Relation Age of Onset   • Cancer Mother 52        stomach or colon   • Lung cancer Father 39   • Breast cancer Sister    • Diabetes Maternal Grandmother       Current Medications:     Current Outpatient Medications   Medication Sig Dispense Refill   • levothyroxine 75 mcg tablet Take 1 tablet (75 mcg total) by mouth daily 90 tablet 1     No current facility-administered medications for this visit  Allergies: Allergies   Allergen Reactions   • Cats Claw (Uncaria Tomentosa) Wheezing   • Dust Mite Extract Cough   • Mixed Feathers Wheezing      Physical Exam:     /74 (BP Location: Left arm, Patient Position: Sitting, Cuff Size: Adult)   Pulse 64   Temp 97 5 °F (36 4 °C)   Ht 5' 3\" (1 6 m)   Wt 53 7 kg (118 lb 6 4 oz)   SpO2 97%   BMI 20 97 kg/m²     Physical Exam  Vitals reviewed  Constitutional:       Appearance: She is well-developed  HENT:      Head: Normocephalic and atraumatic  Right Ear: Tympanic membrane, ear canal and external ear normal       Left Ear: Tympanic membrane, ear canal and external ear normal       Nose: Nose normal       Mouth/Throat:      Mouth: Mucous membranes are moist    Eyes:      Extraocular Movements: Extraocular movements intact  Conjunctiva/sclera: Conjunctivae normal       Pupils: Pupils are equal, round, and reactive to light  Neck:      Thyroid: No thyromegaly  Vascular: No JVD  Cardiovascular:      Rate and Rhythm: Normal rate and regular rhythm        " Pulses: Normal pulses  Heart sounds: Normal heart sounds  No murmur heard  Pulmonary:      Effort: Pulmonary effort is normal       Breath sounds: Normal breath sounds  No wheezing  Abdominal:      General: Bowel sounds are normal  There is no distension  Palpations: Abdomen is soft  There is no mass  Tenderness: There is no abdominal tenderness  Musculoskeletal:         General: No swelling, tenderness or deformity  Normal range of motion  Cervical back: Normal range of motion and neck supple  No muscular tenderness  Right lower leg: No edema  Left lower leg: No edema  Lymphadenopathy:      Cervical: No cervical adenopathy  Skin:     General: Skin is warm  Capillary Refill: Capillary refill takes less than 2 seconds  Neurological:      General: No focal deficit present  Mental Status: She is alert and oriented to person, place, and time  Cranial Nerves: No cranial nerve deficit  Sensory: No sensory deficit  Motor: No weakness or abnormal muscle tone  Coordination: Coordination normal       Gait: Gait normal       Deep Tendon Reflexes: Reflexes normal    Psychiatric:         Mood and Affect: Mood normal          Behavior: Behavior normal          Thought Content:  Thought content normal          Judgment: Judgment normal         Antione Barton MD  [facility]

## 2023-05-30 NOTE — ASSESSMENT & PLAN NOTE
Paperwork faxed back.    Pt could not tolerate bisphosphonate  Continue Vit D and calcium  Recheck dexa  Consider Prolia if still in the osteoporotic range   Recheck 6m

## 2023-05-31 DIAGNOSIS — E87.5 HYPERKALEMIA: Primary | ICD-10-CM

## 2023-05-31 NOTE — TELEPHONE ENCOUNTER
Upon review of the In Basket request and the patient's chart, initial outreach has been made via fax to facility  Please see Contacts section for details       Thank you  Nicole Mccarthy

## 2023-06-01 ENCOUNTER — APPOINTMENT (OUTPATIENT)
Dept: LAB | Facility: CLINIC | Age: 73
End: 2023-06-01
Payer: COMMERCIAL

## 2023-06-01 DIAGNOSIS — E87.5 HYPERKALEMIA: ICD-10-CM

## 2023-06-01 LAB
ANION GAP SERPL CALCULATED.3IONS-SCNC: 1 MMOL/L (ref 4–13)
BUN SERPL-MCNC: 16 MG/DL (ref 5–25)
CALCIUM SERPL-MCNC: 9.2 MG/DL (ref 8.3–10.1)
CHLORIDE SERPL-SCNC: 107 MMOL/L (ref 96–108)
CO2 SERPL-SCNC: 29 MMOL/L (ref 21–32)
CREAT SERPL-MCNC: 0.74 MG/DL (ref 0.6–1.3)
GFR SERPL CREATININE-BSD FRML MDRD: 82 ML/MIN/1.73SQ M
GLUCOSE P FAST SERPL-MCNC: 89 MG/DL (ref 65–99)
POTASSIUM SERPL-SCNC: 5.1 MMOL/L (ref 3.5–5.3)
SODIUM SERPL-SCNC: 137 MMOL/L (ref 135–147)

## 2023-06-01 PROCEDURE — 80048 BASIC METABOLIC PNL TOTAL CA: CPT

## 2023-06-01 PROCEDURE — 36415 COLL VENOUS BLD VENIPUNCTURE: CPT

## 2023-06-12 NOTE — TELEPHONE ENCOUNTER
Upon review of the In Basket request we were able to locate, review, and update the patient chart as requested for CRC: Colonoscopy  Any additional questions or concerns should be emailed to the Practice Liaisons via the appropriate education email address, please do not reply via In Basket      Thank you  Deepak Allen

## 2023-07-18 ENCOUNTER — HOSPITAL ENCOUNTER (OUTPATIENT)
Dept: ULTRASOUND IMAGING | Facility: CLINIC | Age: 73
Discharge: HOME | End: 2023-07-18
Payer: COMMERCIAL

## 2023-07-18 ENCOUNTER — HOSPITAL ENCOUNTER (OUTPATIENT)
Dept: MAMMOGRAPHY | Facility: CLINIC | Age: 73
Discharge: HOME | End: 2023-07-18
Payer: COMMERCIAL

## 2023-07-18 VITALS — HEIGHT: 63 IN | BODY MASS INDEX: 20.91 KG/M2 | WEIGHT: 118 LBS

## 2023-07-18 DIAGNOSIS — R92.8 ABNORMAL MAMMOGRAM OF RIGHT BREAST: Primary | ICD-10-CM

## 2023-07-18 DIAGNOSIS — R92.8 ABNORMAL MAMMOGRAM: ICD-10-CM

## 2023-07-18 PROCEDURE — G0279 TOMOSYNTHESIS, MAMMO: HCPCS

## 2023-07-18 PROCEDURE — 76642 ULTRASOUND BREAST LIMITED: CPT

## 2023-07-18 PROCEDURE — 77065 DX MAMMO INCL CAD UNI: CPT

## 2023-09-04 DIAGNOSIS — E03.8 HYPOTHYROIDISM DUE TO HASHIMOTO'S THYROIDITIS: ICD-10-CM

## 2023-09-04 DIAGNOSIS — E06.3 HYPOTHYROIDISM DUE TO HASHIMOTO'S THYROIDITIS: ICD-10-CM

## 2023-09-05 RX ORDER — LEVOTHYROXINE SODIUM 0.07 MG/1
75 TABLET ORAL DAILY
Qty: 90 TABLET | Refills: 0 | Status: SHIPPED | OUTPATIENT
Start: 2023-09-05

## 2023-09-07 ENCOUNTER — PREP FOR PROCEDURE (OUTPATIENT)
Age: 73
End: 2023-09-07

## 2023-09-07 ENCOUNTER — TELEPHONE (OUTPATIENT)
Age: 73
End: 2023-09-07

## 2023-09-07 DIAGNOSIS — Z86.010 HISTORY OF COLON POLYPS: Primary | ICD-10-CM

## 2023-09-07 NOTE — TELEPHONE ENCOUNTER
Scheduled date of colonoscopy (as of today): 11/21/2023    Physician performing colonoscopy: Dr. Rolan Sheikh    Location of colonoscopy:  [facility]    Bowel prep reviewed with patient: Stan/ Shane    Instructions reviewed with patient by: Sent via [employer]    Clearances: N/A

## 2023-11-07 ENCOUNTER — OFFICE VISIT (OUTPATIENT)
Dept: DERMATOLOGY | Facility: CLINIC | Age: 73
End: 2023-11-07
Payer: COMMERCIAL

## 2023-11-07 ENCOUNTER — ANESTHESIA (OUTPATIENT)
Dept: ANESTHESIOLOGY | Facility: HOSPITAL | Age: 73
End: 2023-11-07

## 2023-11-07 ENCOUNTER — ANESTHESIA EVENT (OUTPATIENT)
Dept: ANESTHESIOLOGY | Facility: HOSPITAL | Age: 73
End: 2023-11-07

## 2023-11-07 VITALS — WEIGHT: 112 LBS | BODY MASS INDEX: 19.84 KG/M2 | HEIGHT: 63 IN | TEMPERATURE: 97.7 F

## 2023-11-07 DIAGNOSIS — D22.5 MULTIPLE BENIGN NEVI OF UPPER EXTREMITY, LOWER EXTREMITY, AND TRUNK: Primary | ICD-10-CM

## 2023-11-07 DIAGNOSIS — L81.4 LENTIGO: ICD-10-CM

## 2023-11-07 DIAGNOSIS — L82.1 SEBORRHEIC KERATOSIS: ICD-10-CM

## 2023-11-07 DIAGNOSIS — L72.0 EPIDERMAL CYST: ICD-10-CM

## 2023-11-07 DIAGNOSIS — D18.01 CHERRY ANGIOMA: ICD-10-CM

## 2023-11-07 DIAGNOSIS — D22.60 MULTIPLE BENIGN NEVI OF UPPER EXTREMITY, LOWER EXTREMITY, AND TRUNK: Primary | ICD-10-CM

## 2023-11-07 DIAGNOSIS — D22.70 MULTIPLE BENIGN NEVI OF UPPER EXTREMITY, LOWER EXTREMITY, AND TRUNK: Primary | ICD-10-CM

## 2023-11-07 PROCEDURE — 99203 OFFICE O/P NEW LOW 30 MIN: CPT | Performed by: DERMATOLOGY

## 2023-11-07 NOTE — PROGRESS NOTES
[facility] Note     Patient Name: Irena Lyle  Encounter Date: 11/7/2023     Have you been cared for by a [facility] in the last 3 years and, if so, which description applies to you? NO. I am considered a "new" patient and must complete all patient intake questions. I am FEMALE/of child-bearing potential.    REVIEW OF SYSTEMS:  Have you recently had or currently have any of the following? Recent fever or chills? No  Any non-healing wound? No  Are you pregnant or planning to become pregnant? No  Are you currently or planning to be nursing or breast feeding? No   PAST MEDICAL HISTORY:  Have you personally ever had or currently have any of the following? If "YES," then please provide more detail. Skin cancer (such as Melanoma, Basal Cell Carcinoma, Squamous Cell Carcinoma? No  Tuberculosis, HIV/AIDS, Hepatitis B or C: No  Radiation Treatment No   HISTORY OF IMMUNOSUPPRESSION:   Do you have a history of any of the following:  Systemic Immunosuppression such as Diabetes, Biologic or Immunotherapy, Chemotherapy, Organ Transplantation, Bone Marrow Transplantation? No    Answering "YES" requires the addition of the dotphrase "IMMUNOSUPPRESSED" as the first diagnosis of the patient's visit. FAMILY HISTORY:  Any "first degree relatives" (parent, brother, sister, or child) with the following? Skin Cancer, Pancreatic or Other Cancer? YES, sister had skin caner type unknown   PATIENT EXPERIENCE:    Do you want the Dermatologist to perform a COMPLETE skin exam today including a clinical examination under the "bra and underwear" areas? Yes  If necessary, do we have your permission to call and leave a detailed message on your Preferred Phone number that includes your specific medical information?   Yes      Allergies   Allergen Reactions   • Cats Claw (Uncaria Tomentosa) Wheezing   • Dust Mite Extract Cough   • Mixed Feathers Wheezing      Current Outpatient Medications:   • levothyroxine 75 mcg tablet, Take 1 tablet by mouth once daily, Disp: 90 tablet, Rfl: 0          Whom besides the patient is providing clinical information about today's encounter? NO ADDITIONAL HISTORIAN (patient alone provided history)    Physical Exam and Assessment/Plan by Diagnosis:    MELANOCYTIC NEVI ("Moles")    Physical Exam:  Anatomic Location Affected:   Mostly on sun-exposed areas of the trunk and extremities  Morphological Description:  Scattered, 1-4mm round to ovoid, symmetrical-appearing, even bordered, skin colored to dark brown macules/papules, mostly in sun-exposed areas    Additional History of Present Condition:  Patient denies any personal history of skin , but reports sister had a skin cancer, unsure of type. Assessment and Plan:  Based on a thorough discussion of this condition and the management approach to it (including a comprehensive discussion of the known risks, side effects and potential benefits of treatment), the patient (family) agrees to implement the following specific plan:  When outside we recommend using a wide brim hat, sunglasses, long sleeve and pants, sunscreen with SPF 99+ with reapplication every 2 hours, or SPF specific clothing   Benign, reassured  Annual skin check     Melanocytic Nevi  Melanocytic nevi ("moles") are tan or brown, raised or flat areas of the skin which have an increased number of melanocytes. Melanocytes are the cells in our body which make pigment and account for skin color. Some moles are present at birth (I.e., "congenital nevi"), while others come up later in life (i.e., "acquired nevi"). The sun can stimulate the body to make more moles. Sunburns are not the only thing that triggers more moles. Chronic sun exposure can do it too. Clinically distinguishing a healthy mole from melanoma may be difficult, even for experienced dermatologists.  The "ABCDE's" of moles have been suggested as a means of helping to alert a person to a suspicious mole and the possible increased risk of melanoma. The suggestions for raising alert are as follows:    Asymmetry: Healthy moles tend to be symmetric, while melanomas are often asymmetric. Asymmetry means if you draw a line through the mole, the two halves do not match in color, size, shape, or surface texture. Asymmetry can be a result of rapid enlargement of a mole, the development of a raised area on a previously flat lesion, scaling, ulceration, bleeding or scabbing within the mole. Any mole that starts to demonstrate "asymmetry" should be examined promptly by a board certified dermatologist.     Border: Healthy moles tend to have discrete, even borders. The border of a melanoma often blends into the normal skin and does not sharply delineate the mole from normal skin. Any mole that starts to demonstrate "uneven borders" should be examined promptly by a board certified dermatologist.     Color: Healthy moles tend to be one color throughout. Melanomas tend to be made up of different colors ranging from dark black, blue, white, or red. Any mole that demonstrates a color change should be examined promptly by a board certified dermatologist.     Diameter: Healthy moles tend to be smaller than 0.6 cm in size; an exception are "congenital nevi" that can be larger. Melanomas tend to grow and can often be greater than 0.6 cm (1/4 of an inch, or the size of a pencil eraser). This is only a guideline, and many normal moles may be larger than 0.6 cm without being unhealthy. Any mole that starts to change in size (small to bigger or bigger to smaller) should be examined promptly by a board certified dermatologist.     Evolving: Healthy moles tend to "stay the same."  Melanomas may often show signs of change or evolution such as a change in size, shape, color, or elevation.   Any mole that starts to itch, bleed, crust, burn, hurt, or ulcerate or demonstrate a change or evolution should be examined promptly by a board certified dermatologist.      Climmie Albino    Physical Exam:  Anatomic Location Affected:  trunk, arms  Morphological Description:  Light brown macules    Assessment and Plan:  Based on a thorough discussion of this condition and the management approach to it (including a comprehensive discussion of the known risks, side effects and potential benefits of treatment), the patient (family) agrees to implement the following specific plan:  When outside we recommend using a wide brim hat, sunglasses, long sleeve and pants, sunscreen with SPF 48+ with reapplication every 2 hours, or SPF specific clothing     What is a lentigo? A lentigo is a pigmented flat or slightly raised lesion with a clearly defined edge. Unlike an ephelis (freckle), it does not fade in the winter months. There are several kinds of lentigo. The name lentigo originally referred to its appearance resembling a small lentil. The plural of lentigo is lentigines, although “lentigos” is also in common use. Who gets lentigines? Lentigines can affect males and females of all ages and races. Solar lentigines are especially prevalent in fair skinned adults. Lentigines associated with syndromes are present at birth or arise during childhood. What causes lentigines? Common forms of lentigo are due to exposure to ultraviolet radiation:  Sun damage including sunburn   Indoor tanning   Phototherapy, especially photochemotherapy (PUVA)    Ionizing radiation, eg radiation therapy, can also cause lentigines. Several familial syndromes associated with widespread lentigines originate from mutations in Murray-MAP kinase, mTOR signaling and PTEN pathways. What is the treatment for lentigines? Most lentigines are left alone. Attempts to lighten them may not be successful.  The following approaches are used:  SPF 50+ broad-spectrum sunscreen   Hydroquinone bleaching cream   Alpha hydroxy acids   Vitamin C   Retinoids   Azelaic acid   Chemical peels  Individual lesions can be permanently removed using:  Cryotherapy   Intense pulsed light   Pigment lasers    How can lentigines be prevented? Lentigines associated with exposure ultraviolet radiation can be prevented by very careful sun protection. Clothing is more successful at preventing new lentigines than are sunscreens. What is the outlook for lentigines? Lentigines usually persist. They may increase in number with age and sun exposure. Some in sun-protected sites may fade and disappear. CHERRY ANGIOMAS    Physical Exam:  Anatomic Location Affected:  trunk  Morphological Description:  Scattered cherry red, 1-4 mm papules. Assessment and Plan:  Based on a thorough discussion of this condition and the management approach to it (including a comprehensive discussion of the known risks, side effects and potential benefits of treatment), the patient (family) agrees to implement the following specific plan:  Monitor for changes  Benign, reassured    Assessment and Plan:    Cherry angioma, also known as Mitali Rad spots, are benign vascular skin lesions. A "cherry angioma" is a firm red, blue or purple papule, 0.1-1 cm in diameter. When thrombosed, they can appear black in colour until evaluated with a dermatoscope when the red or purple colour is more easily seen. Cherry angioma may develop on any part of the body but most often appear on the scalp, face, lips and trunk. An angioma is due to proliferating endothelial cells; these are the cells that line the inside of a blood vessel. Angiomas can arise in early life or later in life; the most common type of angioma is a cherry angioma. Cherry angiomas are very common in males and females of any age or race. They are more noticeable in white skin than in skin of colour. They markedly increase in number from about the age of 36. There may be a family history of similar lesions.  Eruptive cherry angiomas have been rarely reported to be associated with internal malignancy. The cause of angiomas is unknown. Genetic analysis of cherry angiomas has shown that they frequently carry specific somatic missense mutations in the GNAQ and GNA11 (Q209H) genes, which are involved in other vascular and melanocytic proliferations. SEBORRHEIC KERATOSIS; NON-INFLAMED    Physical Exam:  Anatomic Location Affected:  trunk  Morphological Description:  Flat and raised, waxy, smooth to warty textured, yellow to brownish-grey to dark brown to blackish, discrete, "stuck-on" appearing papules. Assessment and Plan:  Based on a thorough discussion of this condition and the management approach to it (including a comprehensive discussion of the known risks, side effects and potential benefits of treatment), the patient (family) agrees to implement the following specific plan:  Monitor for changes  Benign, reassured    Seborrheic Keratosis  A seborrheic keratosis is a harmless warty spot that appears during adult life as a common sign of skin aging. Seborrheic keratoses can arise on any area of skin, covered or uncovered, with the usual exception of the palms and soles. They do not arise from mucous membranes. Seborrheic keratoses can have highly variable appearance. Seborrheic keratoses are extremely common. It has been estimated that over 90% of adults over the age of 61 years have one or more of them. They occur in males and females of all races, typically beginning to erupt in the 35s or 45s. They are uncommon under the age of 21 years. The precise cause of seborrhoeic keratoses is not known. Seborrhoeic keratoses are considered degenerative in nature. As time goes by, seborrheic keratoses tend to become more numerous. Some people inherit a tendency to develop a very large number of them; some people may have hundreds of them. There is no easy way to remove multiple lesions on a single occasion.   Unless a specific lesion is "inflamed" and is causing pain or stinging/burning or is bleeding, most insurance companies do not authorize treatment. EPIDERMAL INCLUSION CYST    Physical Exam:  Anatomic Location Affected:  Right mandibular angle, left shin  Morphological Description:  1.2 cm nodule    Additional History of Present Condition:  Present for years-right mandibular angle, . Patient reports that it has been growing. Lesion on left shin present for 6 months    Assessment and Plan:  Based on a thorough discussion of this condition and the management approach to it (including a comprehensive discussion of the known risks, side effects and potential benefits of treatment), the patient (family) agrees to implement the following specific plan:  Schedule excision 12/19/2023 at 2:10 to remove both lesions    What are epidermal inclusion cysts? Epidermal inclusion cysts are the most common, benign cutaneous cysts. There are many different names for epidermal inclusion cysts, including epidermoid cyst, epidermal cyst, infundibular cyst, inclusion cyst, and keratin cyst. These cysts can occur anywhere on the body and typically present as nodules directly underneath the skin. There is often a visible pore or opening in the center. The cysts are freely moveable and can range from a few millimeters to several centimeters in diameter. The center of epidermoid cysts almost always contains keratin, which has a cheesy appearance, and not sebum. They also do not originate from sebaceous glands. Therefore, epidermal inclusion cysts are not the same as sebaceous cysts. Cysts may remain stable or progressively enlarge over time. There are no reliable predictive factors to tell if an epidermal inclusion cyst will enlarge, become inflamed, or remain quiescent. Infected cysts tend to become larger, turn red, and are more noticeable to the patient. There may be accompanying pain and discomfort. What causes epidermal inclusion cysts?   Epidermal inclusion cysts often appear out of the blue and are not contagious. They are due to a proliferation of epidermal cells within the dermis and are more common in men than women. They occur more frequently in patients in their 20s to 45s. Epidermal inclusion cysts by themselves are usually not inherited, but they can be hereditary in rare syndromes such as Rao syndrome, nodular elastosis with cysts and comedones (Favre-Racouchot syndrome), and basal cell nevus syndrome (Gorlin syndrome). Elderly patients with chronic sun-damaged skin areas have a higher likelihood of developing epidermoid cysts. They often occur in areas where hair follicles have been inflamed or repeatedly irritated are more frequent in patients with acne vulgaris. In the  period, they are called milia. Patients on BRAF inhibitors such as imiquimod and cyclosporine have a higher incidence of epidermoid cysts of the face. How do we diagnose an epidermal inclusion cyst?  Epidermoid inclusion cysts are often diagnosed by history and physical exam. There is usually no need for biopsy prior to removal.  Radiographic and laboratory exams, such as ultrasound studies, are unnecessary and not typically ordered unless the practitioner suspects a genetic condition. What is the treatment for an epidermal inclusion cyst?  Inflamed, uninfected epidermal inclusion cysts rarely resolve spontaneously without therapy or surgical intervention. Treatment is not emergent unless desired by the patient. Definitive treatment is via surgical excision with walls intact. This method will prevent recurrence. This is best done when the cyst is not inflamed, to decrease the probability of rupture during surgery.    A local anesthetic will be injected around the cyst  A small incision is made in the skin overlying the cyst, and contents are expressed  The incision is repaired with sutures    Another option is to use a 4mm punch biopsy with cyst extraction through the defect. Incision and drainage is often needed if the cyst is infected or inflamed. If there is surrounding cellulitis, oral antibiotic therapy may be necessary. The common agents used target methicillin sensitive Staphylococcal aureus and methicillin resistant S aureus in areas of high prevalence. Pre- operative Excision Scheduling Note    Do you have a pacemaker, defibrillator, spinal or brain stimulator? no    Do you take antibiotics before skin or dental procedures? no  If yes, will likely require pre-operative antibiotics. Ask  the patient why they take the antibiotics (usually because of joint replacement). Do you have a history of a joint replacements within the past 2 years? no   If yes, will likely require pre-operative antibiotics. Ask if orthopaedic surgeon has prescribed pre-operative antibiotics to take before procedures/dental work? Do you take any OTC medications that thin your blood (Aspirin, Aleve, Ibuprofen) or supplements that thin your blood (fish oil, garlic, vitamin E, Ginko Biloba)? no    Do you take any prescribed medications that thin your blood (Coumadin, Plavix, Xarelto, Eliquis or another prescribed blood thinner)? no    Do you have an allergy to lidocaine or epinephrine? no    Do you have allergies to Iodine? no    Do you wear a lidocaine patch? no    Have you ever been diagnosed with HIV, AIDS, Hep B and Hep C? no    Do you use a cane, walker or wheelchair? no    Is the patient from a nursing home? no If yes, Is there any special accommodations that is needed for patient No    Do you smoke? no      If yes,  patient to try and stop 2 days before surgery and 7 days after the surgery. Minimizing smoking as much as possible during this time will improve healing and the cosmetic result after surgery. Do you use supplemental oxygen? If so, how many liters and can you be off it for a short period of time?  No    Scribe Attestation    I,:  Yamilka Elkins MA am acting as a scribe while in the presence of the attending physician.:       I,:  Spike Umanzor MD personally performed the services described in this documentation    as scribed in my presence.:

## 2023-11-07 NOTE — PATIENT INSTRUCTIONS
MELANOCYTIC NEVI ("Moles")    Physical Exam:  Anatomic Location Affected:   Mostly on sun-exposed areas of the trunk and extremities  Morphological Description:  Scattered, 1-4mm round to ovoid, symmetrical-appearing, even bordered, skin colored to dark brown macules/papules, mostly in sun-exposed areas      Assessment and Plan:  Based on a thorough discussion of this condition and the management approach to it (including a comprehensive discussion of the known risks, side effects and potential benefits of treatment), the patient (family) agrees to implement the following specific plan:  When outside we recommend using a wide brim hat, sunglasses, long sleeve and pants, sunscreen with SPF 39+ with reapplication every 2 hours, or SPF specific clothing   Benign, reassured  Annual skin check     Melanocytic Nevi  Melanocytic nevi ("moles") are tan or brown, raised or flat areas of the skin which have an increased number of melanocytes. Melanocytes are the cells in our body which make pigment and account for skin color. Some moles are present at birth (I.e., "congenital nevi"), while others come up later in life (i.e., "acquired nevi"). The sun can stimulate the body to make more moles. Sunburns are not the only thing that triggers more moles. Chronic sun exposure can do it too. Clinically distinguishing a healthy mole from melanoma may be difficult, even for experienced dermatologists. The "ABCDE's" of moles have been suggested as a means of helping to alert a person to a suspicious mole and the possible increased risk of melanoma. The suggestions for raising alert are as follows:    Asymmetry: Healthy moles tend to be symmetric, while melanomas are often asymmetric. Asymmetry means if you draw a line through the mole, the two halves do not match in color, size, shape, or surface texture.  Asymmetry can be a result of rapid enlargement of a mole, the development of a raised area on a previously flat lesion, scaling, ulceration, bleeding or scabbing within the mole. Any mole that starts to demonstrate "asymmetry" should be examined promptly by a board certified dermatologist.     Border: Healthy moles tend to have discrete, even borders. The border of a melanoma often blends into the normal skin and does not sharply delineate the mole from normal skin. Any mole that starts to demonstrate "uneven borders" should be examined promptly by a board certified dermatologist.     Color: Healthy moles tend to be one color throughout. Melanomas tend to be made up of different colors ranging from dark black, blue, white, or red. Any mole that demonstrates a color change should be examined promptly by a board certified dermatologist.     Diameter: Healthy moles tend to be smaller than 0.6 cm in size; an exception are "congenital nevi" that can be larger. Melanomas tend to grow and can often be greater than 0.6 cm (1/4 of an inch, or the size of a pencil eraser). This is only a guideline, and many normal moles may be larger than 0.6 cm without being unhealthy. Any mole that starts to change in size (small to bigger or bigger to smaller) should be examined promptly by a board certified dermatologist.     Evolving: Healthy moles tend to "stay the same."  Melanomas may often show signs of change or evolution such as a change in size, shape, color, or elevation.   Any mole that starts to itch, bleed, crust, burn, hurt, or ulcerate or demonstrate a change or evolution should be examined promptly by a board certified dermatologist.      LENTIGO    Physical Exam:  Anatomic Location Affected:  trunk, arms  Morphological Description:  Light brown macules    Assessment and Plan:  Based on a thorough discussion of this condition and the management approach to it (including a comprehensive discussion of the known risks, side effects and potential benefits of treatment), the patient (family) agrees to implement the following specific plan:  When outside we recommend using a wide brim hat, sunglasses, long sleeve and pants, sunscreen with SPF 25+ with reapplication every 2 hours, or SPF specific clothing     What is a lentigo? A lentigo is a pigmented flat or slightly raised lesion with a clearly defined edge. Unlike an ephelis (freckle), it does not fade in the winter months. There are several kinds of lentigo. The name lentigo originally referred to its appearance resembling a small lentil. The plural of lentigo is lentigines, although “lentigos” is also in common use. Who gets lentigines? Lentigines can affect males and females of all ages and races. Solar lentigines are especially prevalent in fair skinned adults. Lentigines associated with syndromes are present at birth or arise during childhood. What causes lentigines? Common forms of lentigo are due to exposure to ultraviolet radiation:  Sun damage including sunburn   Indoor tanning   Phototherapy, especially photochemotherapy (PUVA)    Ionizing radiation, eg radiation therapy, can also cause lentigines. Several familial syndromes associated with widespread lentigines originate from mutations in Murray-MAP kinase, mTOR signaling and PTEN pathways. What is the treatment for lentigines? Most lentigines are left alone. Attempts to lighten them may not be successful. The following approaches are used:  SPF 50+ broad-spectrum sunscreen   Hydroquinone bleaching cream   Alpha hydroxy acids   Vitamin C   Retinoids   Azelaic acid   Chemical peels  Individual lesions can be permanently removed using:  Cryotherapy   Intense pulsed light   Pigment lasers    How can lentigines be prevented? Lentigines associated with exposure ultraviolet radiation can be prevented by very careful sun protection. Clothing is more successful at preventing new lentigines than are sunscreens. What is the outlook for lentigines?   Lentigines usually persist. They may increase in number with age and sun exposure. Some in sun-protected sites may fade and disappear. CHERRY ANGIOMAS    Physical Exam:  Anatomic Location Affected:  trunk  Morphological Description:  Scattered cherry red, 1-4 mm papules. Assessment and Plan:  Based on a thorough discussion of this condition and the management approach to it (including a comprehensive discussion of the known risks, side effects and potential benefits of treatment), the patient (family) agrees to implement the following specific plan:  Monitor for changes  Benign, reassured    Assessment and Plan:    Cherry angioma, also known as Abrahan Loud spots, are benign vascular skin lesions. A "cherry angioma" is a firm red, blue or purple papule, 0.1-1 cm in diameter. When thrombosed, they can appear black in colour until evaluated with a dermatoscope when the red or purple colour is more easily seen. Cherry angioma may develop on any part of the body but most often appear on the scalp, face, lips and trunk. An angioma is due to proliferating endothelial cells; these are the cells that line the inside of a blood vessel. Angiomas can arise in early life or later in life; the most common type of angioma is a cherry angioma. Cherry angiomas are very common in males and females of any age or race. They are more noticeable in white skin than in skin of colour. They markedly increase in number from about the age of 36. There may be a family history of similar lesions. Eruptive cherry angiomas have been rarely reported to be associated with internal malignancy. The cause of angiomas is unknown. Genetic analysis of cherry angiomas has shown that they frequently carry specific somatic missense mutations in the GNAQ and GNA11 (Q209H) genes, which are involved in other vascular and melanocytic proliferations.     SEBORRHEIC KERATOSIS; NON-INFLAMED    Physical Exam:  Anatomic Location Affected:  trunk  Morphological Description:  Flat and raised, waxy, smooth to warty textured, yellow to brownish-grey to dark brown to blackish, discrete, "stuck-on" appearing papules. Assessment and Plan:  Based on a thorough discussion of this condition and the management approach to it (including a comprehensive discussion of the known risks, side effects and potential benefits of treatment), the patient (family) agrees to implement the following specific plan:  Monitor for changes  Benign, reassured    Seborrheic Keratosis  A seborrheic keratosis is a harmless warty spot that appears during adult life as a common sign of skin aging. Seborrheic keratoses can arise on any area of skin, covered or uncovered, with the usual exception of the palms and soles. They do not arise from mucous membranes. Seborrheic keratoses can have highly variable appearance. Seborrheic keratoses are extremely common. It has been estimated that over 90% of adults over the age of 61 years have one or more of them. They occur in males and females of all races, typically beginning to erupt in the 35s or 45s. They are uncommon under the age of 21 years. The precise cause of seborrhoeic keratoses is not known. Seborrhoeic keratoses are considered degenerative in nature. As time goes by, seborrheic keratoses tend to become more numerous. Some people inherit a tendency to develop a very large number of them; some people may have hundreds of them. There is no easy way to remove multiple lesions on a single occasion. Unless a specific lesion is "inflamed" and is causing pain or stinging/burning or is bleeding, most insurance companies do not authorize treatment.     EPIDERMAL INCLUSION CYST    Physical Exam:  Anatomic Location Affected:  Right mandibular angle, left shin    Assessment and Plan:  Based on a thorough discussion of this condition and the management approach to it (including a comprehensive discussion of the known risks, side effects and potential benefits of treatment), the patient (family) agrees to implement the following specific plan:  Schedule excision 12/19/2023 at 2:10 to remove both lesions    What are epidermal inclusion cysts? Epidermal inclusion cysts are the most common, benign cutaneous cysts. There are many different names for epidermal inclusion cysts, including epidermoid cyst, epidermal cyst, infundibular cyst, inclusion cyst, and keratin cyst. These cysts can occur anywhere on the body and typically present as nodules directly underneath the skin. There is often a visible pore or opening in the center. The cysts are freely moveable and can range from a few millimeters to several centimeters in diameter. The center of epidermoid cysts almost always contains keratin, which has a cheesy appearance, and not sebum. They also do not originate from sebaceous glands. Therefore, epidermal inclusion cysts are not the same as sebaceous cysts. Cysts may remain stable or progressively enlarge over time. There are no reliable predictive factors to tell if an epidermal inclusion cyst will enlarge, become inflamed, or remain quiescent. Infected cysts tend to become larger, turn red, and are more noticeable to the patient. There may be accompanying pain and discomfort. What causes epidermal inclusion cysts? Epidermal inclusion cysts often appear out of the blue and are not contagious. They are due to a proliferation of epidermal cells within the dermis and are more common in men than women. They occur more frequently in patients in their 20s to 45s. Epidermal inclusion cysts by themselves are usually not inherited, but they can be hereditary in rare syndromes such as Rao syndrome, nodular elastosis with cysts and comedones (Favre-Racouchot syndrome), and basal cell nevus syndrome (Gorlin syndrome). Elderly patients with chronic sun-damaged skin areas have a higher likelihood of developing epidermoid cysts.  They often occur in areas where hair follicles have been inflamed or repeatedly irritated are more frequent in patients with acne vulgaris. In the  period, they are called milia. Patients on BRAF inhibitors such as imiquimod and cyclosporine have a higher incidence of epidermoid cysts of the face. How do we diagnose an epidermal inclusion cyst?  Epidermoid inclusion cysts are often diagnosed by history and physical exam. There is usually no need for biopsy prior to removal.  Radiographic and laboratory exams, such as ultrasound studies, are unnecessary and not typically ordered unless the practitioner suspects a genetic condition. What is the treatment for an epidermal inclusion cyst?  Inflamed, uninfected epidermal inclusion cysts rarely resolve spontaneously without therapy or surgical intervention. Treatment is not emergent unless desired by the patient. Definitive treatment is via surgical excision with walls intact. This method will prevent recurrence. This is best done when the cyst is not inflamed, to decrease the probability of rupture during surgery. A local anesthetic will be injected around the cyst  A small incision is made in the skin overlying the cyst, and contents are expressed  The incision is repaired with sutures    Another option is to use a 4mm punch biopsy with cyst extraction through the defect. Incision and drainage is often needed if the cyst is infected or inflamed. If there is surrounding cellulitis, oral antibiotic therapy may be necessary. The common agents used target methicillin sensitive Staphylococcal aureus and methicillin resistant S aureus in areas of high prevalence.

## 2023-11-21 ENCOUNTER — ANESTHESIA EVENT (OUTPATIENT)
Dept: GASTROENTEROLOGY | Facility: AMBULARY SURGERY CENTER | Age: 73
End: 2023-11-21

## 2023-11-21 ENCOUNTER — HOSPITAL ENCOUNTER (OUTPATIENT)
Dept: GASTROENTEROLOGY | Facility: AMBULARY SURGERY CENTER | Age: 73
Setting detail: OUTPATIENT SURGERY
Discharge: HOME | End: 2023-11-21
Attending: INTERNAL MEDICINE
Payer: COMMERCIAL

## 2023-11-21 ENCOUNTER — ANESTHESIA (OUTPATIENT)
Dept: GASTROENTEROLOGY | Facility: AMBULARY SURGERY CENTER | Age: 73
End: 2023-11-21

## 2023-11-21 VITALS
DIASTOLIC BLOOD PRESSURE: 65 MMHG | SYSTOLIC BLOOD PRESSURE: 129 MMHG | WEIGHT: 108 LBS | HEIGHT: 63 IN | RESPIRATION RATE: 18 BRPM | TEMPERATURE: 98.1 F | HEART RATE: 58 BPM | BODY MASS INDEX: 19.14 KG/M2 | OXYGEN SATURATION: 100 %

## 2023-11-21 DIAGNOSIS — Z86.010 HISTORY OF COLON POLYPS: ICD-10-CM

## 2023-11-21 PROCEDURE — G0121 COLON CA SCRN NOT HI RSK IND: HCPCS | Performed by: INTERNAL MEDICINE

## 2023-11-21 RX ORDER — SODIUM CHLORIDE, SODIUM LACTATE, POTASSIUM CHLORIDE, CALCIUM CHLORIDE 600; 310; 30; 20 MG/100ML; MG/100ML; MG/100ML; MG/100ML
INJECTION, SOLUTION INTRAVENOUS CONTINUOUS PRN
Status: DISCONTINUED | OUTPATIENT
Start: 2023-11-21 | End: 2023-11-21

## 2023-11-21 RX ORDER — PROPOFOL 10 MG/ML
INJECTION, EMULSION INTRAVENOUS AS NEEDED
Status: DISCONTINUED | OUTPATIENT
Start: 2023-11-21 | End: 2023-11-21

## 2023-11-21 RX ADMIN — PROPOFOL 120 MG: 10 INJECTION, EMULSION INTRAVENOUS at 09:24

## 2023-11-21 RX ADMIN — SODIUM CHLORIDE, SODIUM LACTATE, POTASSIUM CHLORIDE, AND CALCIUM CHLORIDE: .6; .31; .03; .02 INJECTION, SOLUTION INTRAVENOUS at 09:22

## 2023-11-21 RX ADMIN — PROPOFOL 30 MG: 10 INJECTION, EMULSION INTRAVENOUS at 09:36

## 2023-11-21 RX ADMIN — PROPOFOL 80 MG: 10 INJECTION, EMULSION INTRAVENOUS at 09:30

## 2023-11-21 NOTE — H&P
History and Physical - [facility]  Lorene Jimenez 81 y.o. female MRN: [id number]                  HPI: Sherin Adorno is a 81y.o. year old female who presents for colon polyps. REVIEW OF SYSTEMS: Per the HPI, and otherwise unremarkable.     Historical Information   Past Medical History:   Diagnosis Date    Age-related osteoporosis without current pathological fracture     Hypothyroidism due to Hashimoto's thyroiditis      Past Surgical History:   Procedure Laterality Date    BREAST CYST ASPIRATION Left     ORIF WRIST FRACTURE      SKIN BIOPSY       Social History   Social History     Substance and Sexual Activity   Alcohol Use Yes    Comment: social, <1/month     Social History     Substance and Sexual Activity   Drug Use Never     Social History     Tobacco Use   Smoking Status Never   Smokeless Tobacco Never     Family History   Problem Relation Age of Onset    Cancer Mother 52        stomach or colon    Lung cancer Father 39    Breast cancer Sister 79    Skin cancer Sister     No Known Problems Sister     No Known Problems Brother     Diabetes Maternal Grandmother     No Known Problems Maternal Grandfather     No Known Problems Paternal Grandmother     No Known Problems Paternal Grandfather     No Known Problems Daughter     No Known Problems Daughter     No Known Problems Son     No Known Problems Son     No Known Problems Son        Meds/Allergies       Current Outpatient Medications:     levothyroxine 75 mcg tablet    Allergies   Allergen Reactions    Cats Claw (Uncaria Tomentosa) Wheezing    Dust Mite Extract Cough    Mixed Feathers Wheezing       Objective     /62   Pulse 80   Temp (!) 96.9 °F (36.1 °C) (Temporal)   Resp 16   Ht 5' 3" (1.6 m)   Wt 49 kg (108 lb)   SpO2 100%   BMI 19.13 kg/m²       PHYSICAL EXAM    Gen: NAD  Head: NCAT  CV: RRR  CHEST: Clear  ABD: soft, NT/ND  EXT: no edema      ASSESSMENT/PLAN:  This is a 81y.o. year old female here for colonoscopy, and she is stable and optimized for her procedure.

## 2023-11-21 NOTE — ANESTHESIA PROCEDURE NOTES
Anesthesia Notable Event    Date/Time: 11/21/2023 1:51 PM    Performed by: Gerda Olivares MD  Authorized by: Gerda Olivares MD

## 2023-11-21 NOTE — ANESTHESIA PREPROCEDURE EVALUATION
Procedure:  COLONOSCOPY    Relevant Problems   ENDO   (+) Hypothyroidism      MUSCULOSKELETAL   (+) Chronic bilateral low back pain without sciatica      NEURO/PSYCH   (+) Chronic bilateral low back pain without sciatica        Physical Exam    Airway    Mallampati score: II  TM Distance: >3 FB  Neck ROM: full     Dental   No notable dental hx     Cardiovascular  Rhythm: regular, No weak pulses    Pulmonary   No stridor    Other Findings  post-pubertal.      Anesthesia Plan  ASA Score- 2     Anesthesia Type- IV sedation with anesthesia with ASA Monitors. Additional Monitors:     Airway Plan:            Plan Factors-    Chart reviewed. Existing labs reviewed. Patient summary reviewed. Induction- intravenous. Postoperative Plan-     Informed Consent- Anesthetic plan and risks discussed with patient. I personally reviewed this patient with the CRNA. Discussed and agreed on the Anesthesia Plan with the CRNA. Syed Torres

## 2023-11-21 NOTE — ANESTHESIA POSTPROCEDURE EVALUATION
Post-Op Assessment Note    CV Status:  Stable    Pain management: adequate       Mental Status:  Alert and awake   Hydration Status:  Euvolemic   PONV Controlled:  Controlled   Airway Patency:  Patent     Post Op Vitals Reviewed: Yes    No anethesia notable event occurred.     Staff: CRNA, Anesthesiologist               BP   115/78   Temp 97   Pulse 67   Resp 12   SpO2 100

## 2023-12-15 DIAGNOSIS — E06.3 HYPOTHYROIDISM DUE TO HASHIMOTO'S THYROIDITIS: ICD-10-CM

## 2023-12-15 DIAGNOSIS — E03.8 HYPOTHYROIDISM DUE TO HASHIMOTO'S THYROIDITIS: ICD-10-CM

## 2023-12-15 RX ORDER — LEVOTHYROXINE SODIUM 0.07 MG/1
75 TABLET ORAL DAILY
Qty: 90 TABLET | Refills: 1 | Status: SHIPPED | OUTPATIENT
Start: 2023-12-15

## 2023-12-19 ENCOUNTER — PROCEDURE VISIT (OUTPATIENT)
Dept: DERMATOLOGY | Facility: CLINIC | Age: 73
End: 2023-12-19

## 2023-12-19 VITALS — HEIGHT: 63 IN | WEIGHT: 111.1 LBS | BODY MASS INDEX: 19.68 KG/M2 | TEMPERATURE: 97.5 F

## 2023-12-19 DIAGNOSIS — L72.0 EPIDERMAL CYST: Primary | ICD-10-CM

## 2023-12-19 PROCEDURE — 88304 TISSUE EXAM BY PATHOLOGIST: CPT | Performed by: PATHOLOGY

## 2023-12-19 NOTE — PROGRESS NOTES
PROCEDURE:  EXCISION WITH INTERMEDIATE LAYERED CLOSURE     Attending:   Assistant:  Vicenta Polanco    Pre-Op Diagnosis: Epidermal Inclusion Cyst   Post-Op Diagnosis: Same   Benign versus Malignant Benign       Lesion Anatomic Location: A: Right mandibular angle  Pre-op size: 1.2 cm  Final repaired wound length:  1.3 cm        Written and verbal, witnessed informed consent was obtained. I discussed that excision is a method of removing lesions both benign and malignant lesions.  A portion of normal skin is often taken to ensure completeness of removal.  I reviewed that procedure will include numbing the area,  cutting around and under defect, undermining tissue, and closing the wound with sutures both inside and out.  These sutures are usually removed in 7 to 14 days. Risks (bleeding, pain, infection, scarring, recurrence) and benefits discussed. It was discussed with patient that every effort is made to minimize scar, but scarring is influenced also by extrinsic factor such as location, age and genetics.     Time Out: performed:  yes  Correct patient: yes.   Correct site per Clinic Report: yes  Correct site per Patient Report: yes    LOCAL ANESTHESIA: 1% xylocaine with epi     DESCRIPTION OF PROCEDURE: The patient was brought back into the procedure room, anesthetized locally, prepped and draped in the usual fashion. Using a #15 blade with a scalpel, the lesion was excised in elliptical fashion. The wound was  undermined in the  fascial plane. Hemostasis was achieved with light electrocoagulation. Purpose of undermining was to decrease wound tension and facilitate closure.    The wound was closed with subcutaneous sutures as follows:    Deep suture:6-0 Vicryl      Epidermal edge closure was accomplished with superficial sutures as follows:    Superficial suture: 6-0 Ethilon  Superficial suture type: interrupted (3)     Estimated blood loss less than 3ml.    The patient tolerated the procedure well without  any complications. The wound was cleaned with sterile saline, dried off, surgical vaseline ointment was applied, and the wound was covered. A pressure dressing was applied for stabilization and light pressure. The patient was given detailed oral and written instructions on postoperative care as detailed in consent. The patient left in good medical condition.    PROCEDURE:  EXCISION WITH INTERMEDIATE LAYERED CLOSURE  Attending:   Assistant: Vicenta Polanco    Pre-Op Diagnosis: Epidermal Inclusion Cyst   Post-Op Diagnosis: Same   Benign versus Malignant Benign     Lesion Anatomic Location: B: Left shin  Pre-op size: 1.2 cm  Final repaired wound length:  1.7 cm    Written and verbal, witnessed informed consent was obtained. I discussed that excision is a method of removing lesions both benign and malignant lesions.  A portion of normal skin is often taken to ensure completeness of removal.  I reviewed that procedure will include numbing the area,  cutting around and under defect, undermining tissue, and closing the wound with sutures both inside and out.  These sutures are usually removed in 7 to 14 days. Risks (bleeding, pain, infection, scarring, recurrence) and benefits discussed. It was discussed with patient that every effort is made to minimize scar, but scarring is influenced also by extrinsic factor such as location, age and genetics.         LOCAL ANESTHESIA: 1% xylocaine with epi     DESCRIPTION OF PROCEDURE: The patient was brought back into the procedure room, anesthetized locally, prepped and draped in the usual fashion. Using a #15 blade with a scalpel, the lesion was excised in elliptical fashion. The wound was  undermined in the  fascial plane. Hemostasis was achieved with light electrocoagulation. Purpose of undermining was to decrease wound tension and facilitate closure.    The wound was closed with subcutaneous sutures as follows:    Deep suture:4-0 Vicryl      Epidermal edge closure was  accomplished with superficial sutures as follows:    Superficial suture: 4-0 Prolene  Superficial suture type: Interrupted (4)     Estimated blood loss less than 3ml.    The patient tolerated the procedure well without any complications. The wound was cleaned with sterile saline, dried off, surgical vaseline ointment was applied, and the wound was covered. A pressure dressing was applied for stabilization and light pressure. The patient was given detailed oral and written instructions on postoperative care as detailed in consent. The patient left in good medical condition.    Post op impression:  walled of  seroma or vascular endothelioma post trauma.  Clincally benign but pathology submitted.          POSTOP DISCUSSION DISCUSSION AND INSTRUCTIONS FOR PATIENT      Rationale for Procedure  A skin excision allows the dermatologist to remove a lesion. The procedure involves a local numbing medication and removing the entire lesion. Typically, the lesion is being removed because it is atypical, traumatized, or for significant appearance reasons.  The area will be open like a brush burn and allowed to heal.   There will be no sutures.Tissue is sent to pathologist who will reconfirm diagnosis and verify completeness of lesion removal.    Description of Procedure  We would like to perform a skin excision today.  A local anesthetic, similar to the kind that a dentist uses when filling a cavity, will be injected with a very small needle into the skin area to be sampled.  The injected skin and tissue underneath should “go to sleep” and become numbed so that no further pain should be felt.  A scalpel will be used to cut around the lesion and tissue will be submitted to pathology for examination.  Depending on the diagnosis the lesion will be excised with a certain amount of normal skin to help assure completeness of lesion removal.  The physician will discuss in advance the anticipated size and extent of removal.   Bleeding  will occur, but it will stopped with direct pressure, sutures, and electrocautery.    Surgical “Vaseline-type” ointment will also applied after the procedure to help create a barrier between the wound and the outside world.      Risks and Potential Complications  The advantage of a skin excision is that it allows us to remove a problem lesion quickly.  Although this usually permits the lesion to heal as soon as possible with the least scarring, there are some risks and potential complications that include but are not limited to the following:  Some bleeding is normal at time of procedure and some bleeding on gauze is normal  the first few days after surgery.  Profuse bleeding and bleeding with swelling and pain should be reported as detailed  below  Infection is uncommon in skin surgery.  Infection should be reported and is indicated by pain, redness, and discharge of purulent material.  Some dull to at time sharp pain could occur initially the day after surgery.  Persistent pain or increasing pain days after surgery is not expected and should be reported.  Every effort is made to minimize scar, but location, size, and genetics do play a role in scar appearance.  A surgical wound does not achieve its optimal appearance until 6 months.  There are several treatments available if scarring would be problematic including scar creams, silicone pad, laser and scar revision.  Skin discoloration can occur especially in people of color.  Its important to avoid sun on wound in first 6 months after surgery.  Treatment is available if pigment is problematic.  Incomplete removal of the lesion or recurrence of lesion can occur and this would then require further treatment and more surgery.  Nerve Damage/Numbness/Loss of Function is very rare, but is most likely to occur if lesion is large or if it is in a high risk location  Allergic Reaction to lidocaine is rare.  More commonly,  epinephrine is used  with the lidocaine.   Occasionally, epinephrine (aka adrenalin) may cause a brief  feeling of anxiety or jitteriness.  The person at the microscope  (pathologist) may provide additional information that was unexpected. This unexpected finding could provoke the need for additional treatment or evaluation.    What You Will Need to Do After the Procedure  Keep the area clean and dry the first day. Try NOT to remove the bandage for the first day.  Gently clean the area with soap and water and apply Vaseline ointment (this is over the counter and not a prescription) to the excision  site for up to 2 weeks.    Apply a clean appropriately sized bandage to area.  Gauze and paper tape are recommended for sensitive skin.  Return for suture removal as instructed (generally 1 week for the face, 2 weeks for the body).  Take Acetaminophen (Tylenol) for discomfort, if no contraindications.  Do NOT take Ibuprofen or aspirin unless specifically told to do so by your Dermatologist because these medications can make bleeding worse.  Call our office immediately for signs of infection: fever, chills, increased redness, warmth, tenderness, discomfort/pain, or pus or foul smell coming from the wound.    If bleeding is noticed, place a clean cloth over the area and apply firm pressure for thirty minutes.  Check the wound ONLY after 30 minutes of direct pressure; do not cheat and sneak a peak, as that does not count.  If bleeding persists after 30 minutes of legitimate direct pressure, then try one more round of direct pressure for an additional 10 minutes to the area.  Should the bleeding become heavier or not stop after the second attempt, call [facility] directly at [phone] (SKIN) or, if after hours, go to your local Emergency Room/Emergency Department.      Scribe Attestation    I,:  Vicenta Poalnco am acting as a scribe while in the presence of the attending physician.:       I,:  Claudy Waite MD personally performed the services  described in this documentation    as scribed in my presence.:         Jenna Campos  PGY3 Dermatology Resident

## 2023-12-21 ENCOUNTER — TELEPHONE (OUTPATIENT)
Age: 73
End: 2023-12-21

## 2023-12-21 NOTE — TELEPHONE ENCOUNTER
Patient is scheduled at [facility] with 2 suture appointments that she will like to reschedule the appointment at [facility] but it's not giving me the same days at [place] is having transportation issues she will like to be closer to [place]         Please Advise

## 2023-12-21 NOTE — TELEPHONE ENCOUNTER
I tried to reschedule her appointment on December 27 but I did not find anything , her appointment on 01/03 was rescheduled on 01/04 in [place]

## 2023-12-27 ENCOUNTER — OFFICE VISIT (OUTPATIENT)
Dept: DERMATOLOGY | Facility: CLINIC | Age: 73
End: 2023-12-27

## 2023-12-27 DIAGNOSIS — Z48.02 VISIT FOR SUTURE REMOVAL: Primary | ICD-10-CM

## 2023-12-27 PROCEDURE — 88304 TISSUE EXAM BY PATHOLOGIST: CPT | Performed by: PATHOLOGY

## 2023-12-27 PROCEDURE — RECHECK: Performed by: DERMATOLOGY

## 2023-12-27 NOTE — PROGRESS NOTES
"[facility] Dermatology Clinic Note     Patient Name: Aneta Jimenez  Encounter Date: 12/27/2023     Have you been cared for by a [facility] Dermatologist in the last 3 years and, if so, which description applies to you?    Yes.  I have been here within the last 3 years, and my medical history has NOT changed since that time.  I am FEMALE/of child-bearing potential.    REVIEW OF SYSTEMS:  Have you recently had or currently have any of the following? No changes in my recent health.   PAST MEDICAL HISTORY:  Have you personally ever had or currently have any of the following?  If \"YES,\" then please provide more detail. No changes in my medical history.   HISTORY OF IMMUNOSUPPRESSION: Do you have a history of any of the following:  Systemic Immunosuppression such as Diabetes, Biologic or Immunotherapy, Chemotherapy, Organ Transplantation, Bone Marrow Transplantation?  No     Answering \"YES\" requires the addition of the dotphrase \"IMMUNOSUPPRESSED\" as the first diagnosis of the patient's visit.   FAMILY HISTORY:  Any \"first degree relatives\" (parent, brother, sister, or child) with the following?    No changes in my family's known health.   PATIENT EXPERIENCE:    Do you want the Dermatologist to perform a COMPLETE skin exam today including a clinical examination under the \"bra and underwear\" areas?  NO  If necessary, do we have your permission to call and leave a detailed message on your Preferred Phone number that includes your specific medical information?  Yes      Allergies   Allergen Reactions    Cats Claw (Uncaria Tomentosa) Wheezing    Dust Mite Extract Cough    Mixed Feathers Wheezing      Current Outpatient Medications:     levothyroxine 75 mcg tablet, Take 1 tablet by mouth once daily, Disp: 90 tablet, Rfl: 1          Whom besides the patient is providing clinical information about today's encounter?   NO ADDITIONAL HISTORIAN (patient alone provided history)    Physical Exam and Assessment/Plan by " Diagnosis:    Suture removal    Date/Time: 12/27/2023 2:30 PM    Performed by: Wilda Beach MA  Authorized by: Vincent Graves MD  Universal Protocol:  Consent: Verbal consent obtained.  Risks and benefits: risks, benefits and alternatives were discussed  Consent given by: patient  Timeout called at: 12/27/2023 2:24 PM.  Patient understanding: patient states understanding of the procedure being performed  Patient consent: the patient's understanding of the procedure matches consent given  Procedure consent: procedure consent matches procedure scheduled  Relevant documents: relevant documents present and verified  Test results: test results not available  Site marked: the operative site was not marked  Radiology Images displayed and confirmed. If images not available, report reviewed: imaging studies not available  Patient identity confirmed: verbally with patient      Patient location:  Clinic  Location:     Laterality:  Right    Location:  Head/neck    Head/neck location:  Chin (mandibular angle)  Procedure details:     Tools used:  Suture removal kit    Wound appearance:  No sign(s) of infection and good wound healing    Number of sutures removed:  3  Post-procedure details:     Post-removal:  Band-Aid applied (Vaseline applied)    Patient tolerance of procedure:  Tolerated well, no immediate complications

## 2024-01-04 ENCOUNTER — OFFICE VISIT (OUTPATIENT)
Dept: DERMATOLOGY | Facility: CLINIC | Age: 74
End: 2024-01-04

## 2024-01-04 DIAGNOSIS — Z48.02 VISIT FOR SUTURE REMOVAL: Primary | ICD-10-CM

## 2024-01-04 PROCEDURE — RECHECK: Performed by: DERMATOLOGY

## 2024-01-04 NOTE — PROGRESS NOTES
Suture removal    Date/Time: 1/4/2024 11:30 AM    Performed by: Peyton Leon  Authorized by: Claudy Waite MD  Universal Protocol:  Consent: Verbal consent obtained.  Consent given by: patient  Timeout called at: 1/4/2024 11:44 AM.  Patient understanding: patient states understanding of the procedure being performed  Patient consent: the patient's understanding of the procedure matches consent given  Procedure consent: procedure consent matches procedure scheduled  Patient identity confirmed: verbally with patient      Patient location:  Clinic  Location:     Laterality:  Left    Location:  Lower extremity    Lower extremity location: shin.  Procedure details:     Tools used:  Suture removal kit    Wound appearance:  No sign(s) of infection, nonpurulent, good wound healing and clean    Sutures removed: 4.  Post-procedure details:     Post-removal:  Band-Aid applied    Patient tolerance of procedure:  Tolerated well, no immediate complications

## 2024-03-15 ENCOUNTER — OFFICE VISIT (OUTPATIENT)
Dept: URGENT CARE | Age: 74
End: 2024-03-15
Payer: COMMERCIAL

## 2024-03-15 ENCOUNTER — TELEPHONE (OUTPATIENT)
Dept: FAMILY MEDICINE CLINIC | Facility: CLINIC | Age: 74
End: 2024-03-15

## 2024-03-15 VITALS
DIASTOLIC BLOOD PRESSURE: 80 MMHG | TEMPERATURE: 99.2 F | RESPIRATION RATE: 16 BRPM | HEART RATE: 86 BPM | SYSTOLIC BLOOD PRESSURE: 134 MMHG | OXYGEN SATURATION: 100 %

## 2024-03-15 DIAGNOSIS — J20.9 ACUTE BRONCHITIS, UNSPECIFIED ORGANISM: Primary | ICD-10-CM

## 2024-03-15 PROCEDURE — 99214 OFFICE O/P EST MOD 30 MIN: CPT | Performed by: PHYSICIAN ASSISTANT

## 2024-03-15 RX ORDER — AZITHROMYCIN 250 MG/1
TABLET, FILM COATED ORAL
Qty: 6 TABLET | Refills: 0 | Status: SHIPPED | OUTPATIENT
Start: 2024-03-15 | End: 2024-03-19

## 2024-03-15 RX ORDER — BROMPHENIRAMINE MALEATE, PSEUDOEPHEDRINE HYDROCHLORIDE, AND DEXTROMETHORPHAN HYDROBROMIDE 2; 30; 10 MG/5ML; MG/5ML; MG/5ML
10 SYRUP ORAL 3 TIMES DAILY PRN
Qty: 120 ML | Refills: 0 | Status: SHIPPED | OUTPATIENT
Start: 2024-03-15

## 2024-03-15 NOTE — TELEPHONE ENCOUNTER
Left message on both wife and 's cell. Patient scheduled for Monday with fever, body aches, and cough. Per Dr. Kimball, patient should not wait until Monday with flu like symptoms and should be seen at urgent care today if possible.

## 2024-03-15 NOTE — PATIENT INSTRUCTIONS
Take Bromphed as prescribed, recommend daytime only and pseudoephedrine can make you hyper.   Mucinex DM as needed for cough at night.   Take azithromycin as prescribed.   If symptoms are not improved come Monday, follow-up with PCP.   If symptoms worsen or new symptoms develop, report to the emergency department immediately.

## 2024-03-16 NOTE — PROGRESS NOTES
[facility]        NAME: Aneta Jimenez is a 73 y.o. female  : 1950    MRN: [id number]  DATE: March 15, 2024  TIME: 8:47 PM    Assessment and Plan   Acute bronchitis, unspecified organism [J20.9]  1. Acute bronchitis, unspecified organism  azithromycin (ZITHROMAX) 250 mg tablet    brompheniramine-pseudoephedrine-DM 30-2-10 MG/5ML syrup      Patient presents with symptoms and examination concerning for bacterial bronchitis.  She will be started on a Z-Deni and Bromfed to treat.      Patient Instructions     Patient Instructions   Take Bromphed as prescribed, recommend daytime only and pseudoephedrine can make you hyper.   Mucinex DM as needed for cough at night.   Take azithromycin as prescribed.   If symptoms are not improved come Monday, follow-up with PCP.   If symptoms worsen or new symptoms develop, report to the emergency department immediately.     Follow up with PCP in 3-5 days.  Proceed to  ER if symptoms worsen.    Chief Complaint     Chief Complaint   Patient presents with    Cold Like Symptoms     Fever, cough, headache and joint pain in her knees and lower right side of her back         History of Present Illness       73-year-old female presents with complaint of sinus congestion and cough since Monday.  Reports cough is productive of clear sputum.  She notes that she had a fever of 101.6 earlier today.  She reports that she was on vacation in [place] and returned on Monday when symptoms started.  She attempted to get in with her primary care doctor provider but they did not have any appointments available till Monday.  Her PCP called her and recommended that she come here.        Review of Systems   Review of Systems   Constitutional:  Positive for chills, fatigue and fever.   HENT:  Positive for congestion and rhinorrhea.    Respiratory:  Positive for cough. Negative for shortness of breath.    Cardiovascular:  Positive for chest pain (burning with cough).   Musculoskeletal:   Positive for myalgias.         Current Medications       Current Outpatient Medications:     azithromycin (ZITHROMAX) 250 mg tablet, Take 2 tablets today then 1 tablet daily x 4 days, Disp: 6 tablet, Rfl: 0    brompheniramine-pseudoephedrine-DM 30-2-10 MG/5ML syrup, Take 10 mL by mouth 3 (three) times a day as needed for allergies, Disp: 120 mL, Rfl: 0    levothyroxine 75 mcg tablet, Take 1 tablet by mouth once daily, Disp: 90 tablet, Rfl: 1    Current Allergies     Allergies as of 03/15/2024 - Reviewed 03/15/2024   Allergen Reaction Noted    Cats claw (uncaria tomentosa) Wheezing 11/10/2022    Dust mite extract Cough 11/10/2022    Mixed feathers Wheezing 11/10/2022            The following portions of the patient's history were reviewed and updated as appropriate: allergies, current medications, past family history, past medical history, past social history, past surgical history and problem list.     Past Medical History:   Diagnosis Date    Age-related osteoporosis without current pathological fracture     Hypothyroidism due to Hashimoto's thyroiditis        Past Surgical History:   Procedure Laterality Date    BREAST CYST ASPIRATION Left     ORIF WRIST FRACTURE      SKIN BIOPSY         Family History   Problem Relation Age of Onset    Cancer Mother 47        stomach or colon    Lung cancer Father 41    Breast cancer Sister 70    Skin cancer Sister     No Known Problems Sister     No Known Problems Brother     Diabetes Maternal Grandmother     No Known Problems Maternal Grandfather     No Known Problems Paternal Grandmother     No Known Problems Paternal Grandfather     No Known Problems Daughter     No Known Problems Daughter     No Known Problems Son     No Known Problems Son     No Known Problems Son          Medications have been verified.        Objective   /80 (BP Location: Left arm, Patient Position: Sitting, Cuff Size: Adult)   Pulse 86   Temp 99.2 °F (37.3 °C) (Tympanic)   Resp 16   SpO2 100%    No LMP recorded. Patient is postmenopausal.       Physical Exam     Physical Exam  Vitals and nursing note reviewed.   Constitutional:       General: She is not in acute distress.     Appearance: Normal appearance. She is not ill-appearing or toxic-appearing.   HENT:      Head: Normocephalic and atraumatic.      Jaw: No trismus.      Right Ear: Tympanic membrane, ear canal and external ear normal. There is no impacted cerumen. No foreign body.      Left Ear: Tympanic membrane, ear canal and external ear normal. There is no impacted cerumen. No foreign body.      Nose: Mucosal edema, congestion and rhinorrhea present. No nasal deformity. Rhinorrhea is clear.      Right Nostril: No foreign body, epistaxis or occlusion.      Left Nostril: No foreign body, epistaxis or occlusion.      Right Turbinates: Not enlarged, swollen or pale.      Left Turbinates: Not enlarged, swollen or pale.      Mouth/Throat:      Lips: Pink. No lesions.      Mouth: Mucous membranes are moist. No injury, oral lesions or angioedema.      Dentition: Normal dentition.      Tongue: No lesions. Tongue does not deviate from midline.      Palate: No mass and lesions.      Pharynx: Uvula midline. No pharyngeal swelling, oropharyngeal exudate, posterior oropharyngeal erythema or uvula swelling.      Tonsils: No tonsillar exudate or tonsillar abscesses.   Eyes:      General: Lids are normal. Gaze aligned appropriately. No allergic shiner.     Extraocular Movements: Extraocular movements intact.   Cardiovascular:      Rate and Rhythm: Normal rate and regular rhythm.      Heart sounds: Normal heart sounds, S1 normal and S2 normal.   Pulmonary:      Effort: Pulmonary effort is normal.      Breath sounds: Normal breath sounds. No decreased breath sounds, wheezing, rhonchi or rales.      Comments: Patient speaking in full sentences with no increased respiratory effort.   No audible wheezing or stridor.   Bronchial cough  Lymphadenopathy:      Cervical: No  "cervical adenopathy.   Skin:     General: Skin is warm and dry.   Neurological:      Mental Status: She is alert and oriented to person, place, and time.      Coordination: Coordination is intact.      Gait: Gait is intact.   Psychiatric:         Attention and Perception: Attention and perception normal.         Mood and Affect: Mood and affect normal.         Speech: Speech normal.         Behavior: Behavior is cooperative.               Note: Portions of this record may have been created with voice recognition software. Occasional wrong word or \"sound a like\" substitutions may have occurred due to the inherent limitations of voice recognition software. Please read the chart carefully and recognize, using context, where substitutions have occurred.*      "

## 2024-03-17 ENCOUNTER — TELEPHONE (OUTPATIENT)
Dept: OTHER | Facility: OTHER | Age: 74
End: 2024-03-17

## 2024-03-18 NOTE — TELEPHONE ENCOUNTER
Patient is calling regarding cancelling an appointment.     Date/Time: 03/18 @ 10 am     Patient was rescheduled: YES [ ] NO [ X]     Patient requesting call back to reschedule: YES [ ] NO [X]

## 2024-05-06 ENCOUNTER — TELEPHONE (OUTPATIENT)
Age: 74
End: 2024-05-06

## 2024-05-06 NOTE — TELEPHONE ENCOUNTER
PT requesting a referral for a routine mammo and US for the left breast, and a diagnostic referral for the right breast.    Also, she is inquiring if she would need a thyroid lab test, since she is down to her last refill of levothyroxine.    Please advise    Thank you

## 2024-05-07 DIAGNOSIS — E06.3 HYPOTHYROIDISM DUE TO HASHIMOTO'S THYROIDITIS: Primary | ICD-10-CM

## 2024-05-07 DIAGNOSIS — E03.8 HYPOTHYROIDISM DUE TO HASHIMOTO'S THYROIDITIS: Primary | ICD-10-CM

## 2024-05-07 NOTE — TELEPHONE ENCOUNTER
Pt returning call, advised orders for mammo and US are still valid and that there is now a lab order for her TSH.  She will have done prior to next appt. ISRAEL

## 2024-06-05 ENCOUNTER — APPOINTMENT (OUTPATIENT)
Dept: LAB | Facility: CLINIC | Age: 74
End: 2024-06-05
Payer: COMMERCIAL

## 2024-06-05 DIAGNOSIS — E03.8 HYPOTHYROIDISM DUE TO HASHIMOTO'S THYROIDITIS: ICD-10-CM

## 2024-06-05 DIAGNOSIS — E06.3 HYPOTHYROIDISM DUE TO HASHIMOTO'S THYROIDITIS: ICD-10-CM

## 2024-06-05 LAB — TSH SERPL DL<=0.05 MIU/L-ACNC: 1.22 UIU/ML (ref 0.45–4.5)

## 2024-06-05 PROCEDURE — 36415 COLL VENOUS BLD VENIPUNCTURE: CPT

## 2024-06-05 PROCEDURE — 84443 ASSAY THYROID STIM HORMONE: CPT

## 2024-06-10 ENCOUNTER — OFFICE VISIT (OUTPATIENT)
Dept: FAMILY MEDICINE CLINIC | Facility: CLINIC | Age: 74
End: 2024-06-10
Payer: COMMERCIAL

## 2024-06-10 VITALS
BODY MASS INDEX: 18.82 KG/M2 | DIASTOLIC BLOOD PRESSURE: 62 MMHG | HEART RATE: 75 BPM | SYSTOLIC BLOOD PRESSURE: 112 MMHG | WEIGHT: 106.2 LBS | HEIGHT: 63 IN | OXYGEN SATURATION: 99 % | TEMPERATURE: 97.6 F

## 2024-06-10 DIAGNOSIS — Z13.6 SCREENING FOR CARDIOVASCULAR, RESPIRATORY, AND GENITOURINARY DISEASES: ICD-10-CM

## 2024-06-10 DIAGNOSIS — Z78.0 ASYMPTOMATIC POSTMENOPAUSAL STATE: ICD-10-CM

## 2024-06-10 DIAGNOSIS — R63.4 WEIGHT LOSS: ICD-10-CM

## 2024-06-10 DIAGNOSIS — Z00.00 ANNUAL PHYSICAL EXAM: Primary | ICD-10-CM

## 2024-06-10 DIAGNOSIS — Z13.83 SCREENING FOR CARDIOVASCULAR, RESPIRATORY, AND GENITOURINARY DISEASES: ICD-10-CM

## 2024-06-10 DIAGNOSIS — Z13.89 SCREENING FOR CARDIOVASCULAR, RESPIRATORY, AND GENITOURINARY DISEASES: ICD-10-CM

## 2024-06-10 DIAGNOSIS — H61.21 RIGHT EAR IMPACTED CERUMEN: ICD-10-CM

## 2024-06-10 PROCEDURE — 69210 REMOVE IMPACTED EAR WAX UNI: CPT | Performed by: FAMILY MEDICINE

## 2024-06-10 PROCEDURE — 99397 PER PM REEVAL EST PAT 65+ YR: CPT | Performed by: FAMILY MEDICINE

## 2024-06-10 PROCEDURE — 99213 OFFICE O/P EST LOW 20 MIN: CPT | Performed by: FAMILY MEDICINE

## 2024-06-10 NOTE — ASSESSMENT & PLAN NOTE
Recent Tsh was at goal.  Exam is unremarkable. Check CBC, and CMP. Monitor diet - ok to add more protein. Monitor weight. Recheck if weight continues to decrease (e.g. 4-5lb in 3-4w). Otherwise recheck 1y

## 2024-06-10 NOTE — PROGRESS NOTES
Adult Annual Physical  Name: Aneta Jimenez      : 1950      MRN: [id number]  Encounter Provider: Danie Kimball MD  Encounter Date: 6/10/2024   Encounter department: [facility]    Assessment & Plan   1. Annual physical exam  2. Weight loss  Assessment & Plan:  Recent Tsh was at goal.  Exam is unremarkable. Check CBC, and CMP. Monitor diet - ok to add more protein. Monitor weight. Recheck if weight continues to decrease (e.g. 4-5lb in 3-4w). Otherwise recheck 1y  Orders:  -     CBC and differential; Future  -     Comprehensive metabolic panel; Future  3. Right ear impacted cerumen  Assessment & Plan:  Improved s/p cerumen removal.  Continue to monitor  Orders:  -     Ear cerumen removal  4. Asymptomatic postmenopausal state  -     DXA bone density spine hip and pelvis; Future; Expected date: 06/10/2024  5. Screening for cardiovascular, respiratory, and genitourinary diseases  -     Comprehensive metabolic panel; Future  -     Lipid panel; Future    Immunizations and preventive care screenings were discussed with patient today. Appropriate education was printed on patient's after visit summary.    Counseling:  Exercise: the importance of regular exercise/physical activity was discussed. Recommend exercise 3-5 times per week for at least 30 minutes.          History of Present Illness     Adult Annual Physical:  Patient presents for annual physical.   - pt reports that she has lost weight since last year without trying. She remains actiuve, exercises just about everyday, and has good energy. Pt wonders if it is diet, and if she should eat more protein  - has decreased hearing on R. Has hx of pulsatile tinnitus when she gets cerumen impaction, and notes that this just started to happen again.     Diet and Physical Activity:  - Diet/Nutrition: well balanced diet.  - Exercise: walking, 5-7 times a week on average and 30-60 minutes on average.    Depression Screening:  -  PHQ-2 Score: 0    General Health:  - Sleep: sleeps well and 7-8 hours of sleep on average.  - Hearing: normal hearing bilateral ears. May have wax in R ear  - Vision: no vision problems and most recent eye exam > 1 year ago.  - Dental: regular dental visits and brushes teeth twice daily.    /GYN Health:  - Follows with GYN: no.   - Menopause: postmenopausal.   - History of STDs: no    Advanced Care Planning:  - Has an advanced directive?: no    - Has a durable medical POA?: no    - ACP document given to patient?: no      Review of Systems   Constitutional:  Positive for unexpected weight change.   HENT:  Positive for hearing loss (R ear sl decrease).    Eyes: Negative.    Respiratory: Negative.     Cardiovascular: Negative.    Gastrointestinal: Negative.    Endocrine: Negative.    Genitourinary: Negative.    Musculoskeletal: Negative.    Skin: Negative.    Allergic/Immunologic: Negative.    Neurological: Negative.    Hematological: Negative.    Psychiatric/Behavioral: Negative.       Past Medical History   Past Medical History:   Diagnosis Date   • Age-related osteoporosis without current pathological fracture    • Hypothyroidism due to Hashimoto's thyroiditis      Past Surgical History:   Procedure Laterality Date   • BREAST CYST ASPIRATION Left    • ORIF WRIST FRACTURE     • SKIN BIOPSY       Family History   Problem Relation Age of Onset   • Cancer Mother 47        stomach or colon   • Lung cancer Father 41   • Breast cancer Sister 70   • Skin cancer Sister    • No Known Problems Sister    • No Known Problems Brother    • Diabetes Maternal Grandmother    • No Known Problems Maternal Grandfather    • No Known Problems Paternal Grandmother    • No Known Problems Paternal Grandfather    • No Known Problems Daughter    • No Known Problems Daughter    • No Known Problems Son    • No Known Problems Son    • No Known Problems Son      Current Outpatient Medications on File Prior to Visit   Medication Sig Dispense  "Refill   • levothyroxine 75 mcg tablet Take 1 tablet by mouth once daily (Patient taking differently: Take 75 mcg by mouth daily Monday to Saturday. Skipping Sunday) 90 tablet 1   • [DISCONTINUED] brompheniramine-pseudoephedrine-DM 30-2-10 MG/5ML syrup Take 10 mL by mouth 3 (three) times a day as needed for allergies 120 mL 0     No current facility-administered medications on file prior to visit.     Allergies   Allergen Reactions   • Cats Claw (Uncaria Tomentosa) Wheezing   • Dust Mite Extract Cough   • Mixed Feathers Wheezing      Current Outpatient Medications on File Prior to Visit   Medication Sig Dispense Refill   • levothyroxine 75 mcg tablet Take 1 tablet by mouth once daily (Patient taking differently: Take 75 mcg by mouth daily Monday to Saturday. Skipping Sunday) 90 tablet 1   • [DISCONTINUED] brompheniramine-pseudoephedrine-DM 30-2-10 MG/5ML syrup Take 10 mL by mouth 3 (three) times a day as needed for allergies 120 mL 0     No current facility-administered medications on file prior to visit.      Social History     Tobacco Use   • Smoking status: Never     Passive exposure: Never   • Smokeless tobacco: Never   Vaping Use   • Vaping status: Never Used   Substance and Sexual Activity   • Alcohol use: Yes     Comment: social, <1/month   • Drug use: Never   • Sexual activity: Yes     Partners: Male       Objective     /62   Pulse 75   Temp 97.6 °F (36.4 °C)   Ht 5' 3\" (1.6 m)   Wt 48.2 kg (106 lb 3.2 oz)   SpO2 99%   BMI 18.81 kg/m²     Physical Exam  Vitals reviewed.   Constitutional:       Appearance: She is well-developed.   HENT:      Head: Normocephalic and atraumatic.      Right Ear: External ear normal. There is impacted cerumen.      Left Ear: Tympanic membrane, ear canal and external ear normal.      Nose: Nose normal.      Mouth/Throat:      Mouth: Mucous membranes are moist.   Eyes:      Extraocular Movements: Extraocular movements intact.      Conjunctiva/sclera: Conjunctivae " normal.      Pupils: Pupils are equal, round, and reactive to light.   Neck:      Thyroid: No thyromegaly.      Vascular: No carotid bruit or JVD.   Cardiovascular:      Rate and Rhythm: Normal rate and regular rhythm.      Pulses: Normal pulses.      Heart sounds: Normal heart sounds. No murmur heard.  Pulmonary:      Effort: Pulmonary effort is normal.      Breath sounds: Normal breath sounds. No wheezing.   Abdominal:      General: Bowel sounds are normal. There is no distension.      Palpations: Abdomen is soft. There is no mass.      Tenderness: There is no abdominal tenderness.   Musculoskeletal:         General: No swelling, tenderness or deformity. Normal range of motion.      Cervical back: Normal range of motion and neck supple. No tenderness. No muscular tenderness.      Right lower leg: No edema.      Left lower leg: No edema.   Lymphadenopathy:      Cervical: No cervical adenopathy.   Skin:     General: Skin is warm.      Capillary Refill: Capillary refill takes less than 2 seconds.   Neurological:      General: No focal deficit present.      Mental Status: She is alert and oriented to person, place, and time.      Cranial Nerves: No cranial nerve deficit.      Sensory: No sensory deficit.      Motor: No weakness or abnormal muscle tone.      Coordination: Coordination normal.      Gait: Gait normal.      Deep Tendon Reflexes: Reflexes normal.   Psychiatric:         Mood and Affect: Mood normal.         Behavior: Behavior normal.         Thought Content: Thought content normal.         Judgment: Judgment normal.      Comments: PHQ-2/9 Depression Screening    Little interest or pleasure in doing things: 0 - not at all  Feeling down, depressed, or hopeless: 0 - not at all  PHQ-2 Score: 0  PHQ-2 Interpretation: Negative depression screen     Ear cerumen removal    Date/Time: 6/10/2024 2:00 PM    Performed by: Danie Kimball MD  Authorized by: Danie Kimball MD  Universal  Protocol:  Consent: Verbal consent obtained.  Risks and benefits: risks, benefits and alternatives were discussed  Consent given by: patient  Patient understanding: patient states understanding of the procedure being performed  Patient identity confirmed: verbally with patient    Patient location:  Clinic  Procedure details:     Local anesthetic:  None    Location:  R ear    Procedure type: irrigation with instrumentation      Instrumentation: forceps and loop      Approach:  Natural orifice    Visualization (free text):  Otoscope    Equipment used:  Irrigation, forceps, loop  Post-procedure details:     Complication:  None    Hearing quality:  Improved    Patient tolerance of procedure:  Tolerated well, no immediate complications          Administrative Statements

## 2024-06-12 ENCOUNTER — APPOINTMENT (OUTPATIENT)
Dept: LAB | Facility: CLINIC | Age: 74
End: 2024-06-12
Payer: COMMERCIAL

## 2024-06-12 DIAGNOSIS — Z13.83 SCREENING FOR CARDIOVASCULAR, RESPIRATORY, AND GENITOURINARY DISEASES: ICD-10-CM

## 2024-06-12 DIAGNOSIS — Z13.6 SCREENING FOR CARDIOVASCULAR, RESPIRATORY, AND GENITOURINARY DISEASES: ICD-10-CM

## 2024-06-12 DIAGNOSIS — R63.4 WEIGHT LOSS: ICD-10-CM

## 2024-06-12 DIAGNOSIS — Z13.89 SCREENING FOR CARDIOVASCULAR, RESPIRATORY, AND GENITOURINARY DISEASES: ICD-10-CM

## 2024-06-12 LAB
ALBUMIN SERPL BCP-MCNC: 4.3 G/DL (ref 3.5–5)
ALP SERPL-CCNC: 91 U/L (ref 34–104)
ALT SERPL W P-5'-P-CCNC: 21 U/L (ref 7–52)
ANION GAP SERPL CALCULATED.3IONS-SCNC: 7 MMOL/L (ref 4–13)
AST SERPL W P-5'-P-CCNC: 25 U/L (ref 13–39)
BASOPHILS # BLD AUTO: 0.07 THOUSANDS/ÂΜL (ref 0–0.1)
BASOPHILS NFR BLD AUTO: 1 % (ref 0–1)
BILIRUB SERPL-MCNC: 0.48 MG/DL (ref 0.2–1)
BUN SERPL-MCNC: 22 MG/DL (ref 5–25)
CALCIUM SERPL-MCNC: 9.6 MG/DL (ref 8.4–10.2)
CHLORIDE SERPL-SCNC: 104 MMOL/L (ref 96–108)
CHOLEST SERPL-MCNC: 194 MG/DL
CO2 SERPL-SCNC: 31 MMOL/L (ref 21–32)
CREAT SERPL-MCNC: 0.75 MG/DL (ref 0.6–1.3)
EOSINOPHIL # BLD AUTO: 0.2 THOUSAND/ÂΜL (ref 0–0.61)
EOSINOPHIL NFR BLD AUTO: 3 % (ref 0–6)
ERYTHROCYTE [DISTWIDTH] IN BLOOD BY AUTOMATED COUNT: 13.2 % (ref 11.6–15.1)
GFR SERPL CREATININE-BSD FRML MDRD: 80 ML/MIN/1.73SQ M
GLUCOSE P FAST SERPL-MCNC: 91 MG/DL (ref 65–99)
HCT VFR BLD AUTO: 40 % (ref 34.8–46.1)
HDLC SERPL-MCNC: 68 MG/DL
HGB BLD-MCNC: 13.1 G/DL (ref 11.5–15.4)
IMM GRANULOCYTES # BLD AUTO: 0.02 THOUSAND/UL (ref 0–0.2)
IMM GRANULOCYTES NFR BLD AUTO: 0 % (ref 0–2)
LDLC SERPL CALC-MCNC: 105 MG/DL (ref 0–100)
LYMPHOCYTES # BLD AUTO: 2.18 THOUSANDS/ÂΜL (ref 0.6–4.47)
LYMPHOCYTES NFR BLD AUTO: 35 % (ref 14–44)
MCH RBC QN AUTO: 31.6 PG (ref 26.8–34.3)
MCHC RBC AUTO-ENTMCNC: 32.8 G/DL (ref 31.4–37.4)
MCV RBC AUTO: 96 FL (ref 82–98)
MONOCYTES # BLD AUTO: 0.42 THOUSAND/ÂΜL (ref 0.17–1.22)
MONOCYTES NFR BLD AUTO: 7 % (ref 4–12)
NEUTROPHILS # BLD AUTO: 3.27 THOUSANDS/ÂΜL (ref 1.85–7.62)
NEUTS SEG NFR BLD AUTO: 54 % (ref 43–75)
NONHDLC SERPL-MCNC: 126 MG/DL
NRBC BLD AUTO-RTO: 0 /100 WBCS
PLATELET # BLD AUTO: 273 THOUSANDS/UL (ref 149–390)
PMV BLD AUTO: 10.8 FL (ref 8.9–12.7)
POTASSIUM SERPL-SCNC: 5.3 MMOL/L (ref 3.5–5.3)
PROT SERPL-MCNC: 7.1 G/DL (ref 6.4–8.4)
RBC # BLD AUTO: 4.15 MILLION/UL (ref 3.81–5.12)
SODIUM SERPL-SCNC: 142 MMOL/L (ref 135–147)
TRIGL SERPL-MCNC: 106 MG/DL
WBC # BLD AUTO: 6.16 THOUSAND/UL (ref 4.31–10.16)

## 2024-06-12 PROCEDURE — 85025 COMPLETE CBC W/AUTO DIFF WBC: CPT

## 2024-06-12 PROCEDURE — 80053 COMPREHEN METABOLIC PANEL: CPT

## 2024-06-12 PROCEDURE — 36415 COLL VENOUS BLD VENIPUNCTURE: CPT

## 2024-06-12 PROCEDURE — 80061 LIPID PANEL: CPT

## 2024-06-20 ENCOUNTER — HOSPITAL ENCOUNTER (OUTPATIENT)
Dept: MAMMOGRAPHY | Facility: CLINIC | Age: 74
Discharge: HOME | End: 2024-06-20
Payer: COMMERCIAL

## 2024-06-20 ENCOUNTER — HOSPITAL ENCOUNTER (OUTPATIENT)
Dept: ULTRASOUND IMAGING | Facility: CLINIC | Age: 74
Discharge: HOME | End: 2024-06-20
Payer: COMMERCIAL

## 2024-06-20 VITALS — BODY MASS INDEX: 18.78 KG/M2 | HEIGHT: 63 IN | WEIGHT: 106 LBS

## 2024-06-20 DIAGNOSIS — R92.8 ABNORMAL MAMMOGRAM OF RIGHT BREAST: ICD-10-CM

## 2024-06-20 PROCEDURE — 76642 ULTRASOUND BREAST LIMITED: CPT

## 2024-06-20 PROCEDURE — 77066 DX MAMMO INCL CAD BI: CPT

## 2024-06-20 PROCEDURE — G0279 TOMOSYNTHESIS, MAMMO: HCPCS

## 2024-07-02 DIAGNOSIS — E03.8 HYPOTHYROIDISM DUE TO HASHIMOTO'S THYROIDITIS: ICD-10-CM

## 2024-07-02 DIAGNOSIS — E06.3 HYPOTHYROIDISM DUE TO HASHIMOTO'S THYROIDITIS: ICD-10-CM

## 2024-07-02 RX ORDER — LEVOTHYROXINE SODIUM 0.07 MG/1
75 TABLET ORAL DAILY
Qty: 90 TABLET | Refills: 1 | Status: SHIPPED | OUTPATIENT
Start: 2024-07-02

## 2024-07-02 NOTE — TELEPHONE ENCOUNTER
Reason for call:   [x] Refill   [] Prior Auth  [] Other:     Office:   [x] PCP/Provider -   [] Specialty/Provider -     Medication: levothyroxine 75 mcg tablet Take 1 tablet by mouth once daily       Pharmacy: [facility] [address] [place], [place] [address]BRET SHUKLA [address]     Does the patient have enough for 3 days?   [x] Yes   [] No - Send as HP to POD

## 2024-07-10 PROBLEM — H61.21 RIGHT EAR IMPACTED CERUMEN: Status: RESOLVED | Noted: 2024-06-10 | Resolved: 2024-07-10

## 2025-01-10 DIAGNOSIS — E06.3 HYPOTHYROIDISM DUE TO HASHIMOTO'S THYROIDITIS: ICD-10-CM

## 2025-01-10 RX ORDER — LEVOTHYROXINE SODIUM 75 UG/1
75 TABLET ORAL DAILY
Qty: 90 TABLET | Refills: 0 | Status: SHIPPED | OUTPATIENT
Start: 2025-01-10

## 2025-04-02 ENCOUNTER — OFFICE VISIT (OUTPATIENT)
Dept: FAMILY MEDICINE CLINIC | Facility: CLINIC | Age: 75
End: 2025-04-02
Payer: COMMERCIAL

## 2025-04-02 VITALS
HEART RATE: 50 BPM | BODY MASS INDEX: 19.1 KG/M2 | OXYGEN SATURATION: 99 % | DIASTOLIC BLOOD PRESSURE: 82 MMHG | TEMPERATURE: 98.5 F | SYSTOLIC BLOOD PRESSURE: 114 MMHG | HEIGHT: 63 IN | WEIGHT: 107.8 LBS

## 2025-04-02 DIAGNOSIS — M54.2 CERVICALGIA: Primary | ICD-10-CM

## 2025-04-02 PROCEDURE — 99213 OFFICE O/P EST LOW 20 MIN: CPT | Performed by: FAMILY MEDICINE
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
## 2025-04-03 ENCOUNTER — EVALUATION (OUTPATIENT)
Dept: PHYSICAL THERAPY | Facility: CLINIC | Age: 75
End: 2025-04-03

## 2025-04-03 DIAGNOSIS — M54.2 CERVICALGIA: Primary | ICD-10-CM

## 2025-04-03 PROCEDURE — 97161 PT EVAL LOW COMPLEX 20 MIN: CPT | Performed by: PHYSICAL MEDICINE & REHABILITATION

## 2025-04-03 NOTE — PROGRESS NOTES
PT Evaluation     Today's date: 4/3/2025  Patient name: Aneta Jimenez  : 1950  MRN: [id number]  Referring provider: Jerrell Kimball*  Dx:   Encounter Diagnosis     ICD-10-CM    1. Cervicalgia  M54.2 Ambulatory Referral to Physical Therapy                     Assessment  Impairments: abnormal coordination, abnormal or restricted ROM, activity intolerance, impaired physical strength, pain with function and activity limitations    Assessment details: Pt presents to outpatient physical therapy with pain, joint and soft tissue restrictions, decreased range of motion, and decreased cervical strength all leading to limited activity tolerance. Pt is a good candidate for outpatient physical therapy and would benefit from skilled intervention to address limitations and achieve goals, ensure safe return to preferred activity. Thank you for this referral.   Understanding of Dx/Px/POC: good     Prognosis: good    Goals  ST. Patient will report 25% decrease in pain in 4 weeks.  2. Patient will demonstrate 25% improvement in ROM in 4 weeks.  3. Patient will demonstrate 1/2 grade improvement in strength in 4 weeks.    LT. Patient will be able to perform IADLS without restriction or pain by discharge.  2. Patient will be independent in HEP by discharge.  3. Patient will be able to return to recreational/work duties without restriction or pain by discharge.      Plan  Patient would benefit from: PT eval and skilled PT  Planned modality interventions: cryotherapy and thermotherapy: hydrocollator packs    Planned therapy interventions: IADL retraining, body mechanics training, flexibility, functional ROM exercises, home exercise program, neuromuscular re-education, manual therapy, postural training, strengthening, stretching, therapeutic activities, therapeutic exercise and joint mobilization    Frequency: 2x week  Duration in weeks: 6  Treatment plan discussed with: patient        Subjective  Evaluation    History of Present Illness  Mechanism of injury: Patient presents with c/o L-sided neck pain present for a few weeks. Onset following cleaning in her bathroom. Sharp onset of pain intermittently with sudden movement/sneeze. (+) Loss of ROM. (+) intermittent HA with sharp pain. (-) N/T, dizziness, vision changes. Patient notes power walking regularly up to 3 miles,5x/week with recent shoe change. (+) pillow change  Patient Goals  Patient goals for therapy: decreased pain    Pain  Current pain ratin  At worst pain ratin          Objective     Active Range of Motion   Cervical/Thoracic Spine       Cervical    Flexion: 50 degrees   Extension: 46 degrees      Left lateral flexion: 10 degrees     with pain  Right lateral flexion: 23 degrees      Left rotation: 35 degrees with pain  Right rotation: 55 degrees       Strength/Myotome Testing     Additional Strength Details  UE screen unremarkable, strength grossly 4+/5 shld flexion/abd/ER/IR  - No symptom provocation with UE testing    DNF: lift initiated with chin vs. Forehead, difficulty controlling movement, quick fatigue        General Comments:      Cervical/Thoracic Comments  (+) TTP through sub-occipitals, over L mastoid process, L cervical area  Hypomobility with lateral glides, up/downglide on L, L rotation  Some relief of sxs with gentle cervical distraction         Precautions: symptom irritability  SELF-PAY, limit units    Manuals             C-S lateral glides, up/down glide             SOR w/ gentle manual traction                                       Neuro Re-Ed             DNF HEP            Chin tuck HEP            SNAGs- rotation and ext             Scap squeeze HEP            Chin tuck w/ rotation                                       Ther Ex             Trial UBE retro             No money             Levator st             Doorway pec stretch             Open books             / roll T-s mobility sitting ext--> supine on roll                                        Ther Activity                                       Gait Training                                       Modalities

## 2025-04-04 PROBLEM — M54.50 CHRONIC BILATERAL LOW BACK PAIN WITHOUT SCIATICA: Status: RESOLVED | Noted: 2021-04-14 | Resolved: 2025-04-04

## 2025-04-04 PROBLEM — R53.83 FATIGUE: Status: RESOLVED | Noted: 2023-05-30 | Resolved: 2025-04-04

## 2025-04-04 PROBLEM — G89.29 CHRONIC BILATERAL LOW BACK PAIN WITHOUT SCIATICA: Status: RESOLVED | Noted: 2021-04-14 | Resolved: 2025-04-04

## 2025-04-09 ENCOUNTER — OFFICE VISIT (OUTPATIENT)
Dept: PHYSICAL THERAPY | Facility: CLINIC | Age: 75
End: 2025-04-09

## 2025-04-09 DIAGNOSIS — M54.2 CERVICALGIA: Primary | ICD-10-CM

## 2025-04-09 PROCEDURE — 97112 NEUROMUSCULAR REEDUCATION: CPT | Performed by: PHYSICAL THERAPIST

## 2025-04-09 NOTE — HOME EXERCISE EDUCATION
Program_ID:[id number]   Access Code: [email or web address]  URL: [email or web address]  Date: 04-  Prepared By: Israel Ramírez    Program Notes      Exercises      - Supine Deep Neck Flexor Training - 2 x daily -  x weekly - 2 sets - 10 reps - 5 hold      - Seated Scapular Retraction - 2 x daily -  x weekly - 2 sets - 10 reps - 3 hold      - Seated Cervical Retraction - 2 x daily -  x weekly - 2 sets - 10 reps - 3 hold      - Seated Assisted Cervical Rotation with Towel - 2 x daily -  x weekly - 1 sets - 10 reps - 5 hold      - Cervical Extension AROM with Strap - 2 x daily -  x weekly - 1 sets - 10 reps - 5 hold

## 2025-04-09 NOTE — PROGRESS NOTES
"Daily Note     Today's date: 2025  Patient name: Aneta Jimenez  : 1950  MRN: [id number]  Referring provider: Jerrell Kimball*  Dx:   Encounter Diagnosis     ICD-10-CM    1. Cervicalgia  M54.2           Start Time: 0950  Stop Time: 1027  Total time in clinic (min): 37 minutes    Subjective: Pt reports she was able to complete HEP without complications. No new concerns noted at this time.       Objective: See treatment diary below      Assessment: Initiated neuro re-ed and manual program. Tolerated treatment well. HEP reviewed and updated with the patient verbalizing understanding. Improved mobility noted following self stretches. Occasional cuing required for proper exercise technique. Progress as tolerated. Patient would benefit from continued PT      Plan: Continue per plan of care.      Precautions: symptom irritability  SELF-PAY, limit units    Manuals         C-S lateral glides, up/down glide  KCB       SOR w/ gentle manual traction  KCB                         Neuro Re-Ed         DNF HEP 10x5\"        Cervical Retraction  HEP 10x3\" seated       Scap Retraction  HEP 10x3\"        Chin tuck w/ rotation         DNF Hold w/ Cervical Rotation   5x5\" BL                 Ther Ex         Trial UBE retro         Levator st         Doorway pec stretch         Side Lying Thoracic Rotation   10x5\" BL        1/2 roll T-s mobility sitting ext--> supine on roll  10x5\"        Cervical Rotation Stretch w/ Towel   10x5\" BL                 Ther Activity                           Gait Training                           Modalities                                "

## 2025-04-11 ENCOUNTER — APPOINTMENT (OUTPATIENT)
Dept: PHYSICAL THERAPY | Facility: CLINIC | Age: 75
End: 2025-04-11

## 2025-04-16 ENCOUNTER — OFFICE VISIT (OUTPATIENT)
Dept: PHYSICAL THERAPY | Facility: CLINIC | Age: 75
End: 2025-04-16

## 2025-04-16 DIAGNOSIS — M54.2 CERVICALGIA: Primary | ICD-10-CM

## 2025-04-16 PROCEDURE — 97112 NEUROMUSCULAR REEDUCATION: CPT | Performed by: PHYSICAL THERAPIST

## 2025-04-16 NOTE — PROGRESS NOTES
"Daily Note     Today's date: 2025  Patient name: Aneta Jimenez  : 1950  MRN: [id number]  Referring provider: Jerrell Kimball*  Dx:   Encounter Diagnosis     ICD-10-CM    1. Cervicalgia  M54.2           Start Time: 1030  Stop Time: 1110  Total time in clinic (min): 40 minutes    Subjective: Pt with no new concerns noted at this time. No significant increase in symptoms reported following previous PT session.       Objective: See treatment diary below      Assessment: Tolerated treatment well. Continued with current POC with good tolerance from the patient. Occasional cuing required for proper exercise technique. Fatigue noted with DNF exercises. No pain reported with exercises. HEP reviewed and updated with the patient verbalizing understanding. Progress as tolerated. Patient would benefit from continued PT      Plan: Continue per plan of care.      Precautions: symptom irritability  SELF-PAY, limit units    Manuals         C-S lateral glides, up/down glide  KCB KCB      SOR w/ gentle manual traction  KCB KCB                        Neuro Re-Ed         DNF HEP 10x5\"  10x5\"       Cervical Retraction  HEP 10x3\" seated 10x3\" seated       Scap Retraction  HEP 10x3\"  20x3\"       Chin tuck w/ rotation         DNF Hold w/ Cervical Rotation   5x5\" BL  2x5 5\" hold                Ther Ex         Trial UBE retro         UT Stretch    10x5\" BL       Doorway pec stretch         Side Lying Thoracic Rotation   10x5\" BL  10x5\" BL       1/2 roll T-s mobility sitting ext--> supine on roll  10x5\"  10x5\"       Cervical Rotation Stretch w/ Towel   10x5\" BL                 Ther Activity                           Gait Training                           Modalities                                  "

## 2025-04-23 ENCOUNTER — OFFICE VISIT (OUTPATIENT)
Dept: PHYSICAL THERAPY | Facility: CLINIC | Age: 75
End: 2025-04-23

## 2025-04-23 DIAGNOSIS — M54.2 CERVICALGIA: Primary | ICD-10-CM

## 2025-04-23 PROCEDURE — 97112 NEUROMUSCULAR REEDUCATION: CPT | Performed by: PHYSICAL THERAPIST

## 2025-04-23 NOTE — HOME EXERCISE EDUCATION
Program_ID:[id number]   Access Code: [email or web address]  URL: [email or web address]  Date: 04-  Prepared By: Israel Ramírez    Program Notes      Exercises      - Supine Deep Neck Flexor Training - 2 x daily -  x weekly - 2 sets - 10 reps - 5 hold      - Seated Cervical Retraction - 2 x daily -  x weekly - 2 sets - 10 reps - 3 hold      - Seated Assisted Cervical Rotation with Towel - 2 x daily -  x weekly - 1 sets - 10 reps - 5 hold      - Cervical Extension AROM with Strap - 2 x daily -  x weekly - 1 sets - 10 reps - 5 hold      - Seated Cervical Sidebending Stretch - 2 x daily -  x weekly - 1 sets - 10 reps - 5 hold      - Shoulder External Rotation and Scapular Retraction with Resistance - 2 x daily -  x weekly - 2-3 sets - 10 reps - 3 hold      - Doorway Pec Stretch at 90 Degrees Abduction - 2 x daily -  x weekly - 1 sets - 10 reps - 5 hold

## 2025-04-23 NOTE — PROGRESS NOTES
"Daily Note     Today's date: 2025  Patient name: Aneta Jimenez  : 1950  MRN: [id number]  Referring provider: Jerrell Kimball*  Dx:   Encounter Diagnosis     ICD-10-CM    1. Cervicalgia  M54.2           Start Time: 1200  Stop Time: 1238  Total time in clinic (min): 38 minutes    Subjective: Pt with no new concerns noted at this time.       Objective: See treatment diary below      Assessment: Tolerated treatment well. Continued with current POC with good tolerance from the patient. No pain reported with exercises. Occasional cuing required for proper exercise technique. HEP reviewed and updated with the patient verbalizing understanding. Pt to continue with HEP and advised to contact clinic if additional PT services are required.       Plan: D/C PT and continue with HEP.      Precautions: symptom irritability  SELF-PAY, limit units    Manuals       C-S lateral glides, up/down glide  KCB KCB KCB     SOR w/ gentle manual traction  KCB KCB KCB                       Neuro Re-Ed         DNF HEP 10x5\"  10x5\"  10x5\"      Cervical Retraction  HEP 10x3\" seated 10x3\" seated  10x3\" seated      Scap Retraction  HEP 10x3\"  20x3\"  20x3\"     Chin tuck w/ rotation         DNF Hold w/ Cervical Rotation   5x5\" BL  2x5 5\" hold  2x5 5\" hold               Ther Ex         Trial UBE retro         UT Stretch    10x5\" BL  10x5\" BL     Doorway pec stretch    10x5\"      Side Lying Thoracic Rotation   10x5\" BL  10x5\" BL  10x5\" BL      1/2 roll T-s mobility sitting ext--> supine on roll  10x5\"  10x5\"  10x5\"     Cervical Rotation Stretch w/ Towel   10x5\" BL                 Ther Activity                           Gait Training                           Modalities                                    "

## 2025-05-05 DIAGNOSIS — E06.3 HYPOTHYROIDISM DUE TO HASHIMOTO'S THYROIDITIS: ICD-10-CM

## 2025-05-06 RX ORDER — LEVOTHYROXINE SODIUM 75 UG/1
75 TABLET ORAL DAILY
Qty: 90 TABLET | Refills: 1 | Status: SHIPPED | OUTPATIENT
Start: 2025-05-06

## 2025-06-06 ENCOUNTER — HOSPITAL ENCOUNTER (OUTPATIENT)
Facility: HOSPITAL | Age: 75
Discharge: HOME | End: 2025-06-06
Payer: COMMERCIAL

## 2025-06-06 VITALS — HEIGHT: 63 IN | WEIGHT: 107 LBS | BODY MASS INDEX: 18.96 KG/M2

## 2025-06-06 DIAGNOSIS — Z78.0 ASYMPTOMATIC POSTMENOPAUSAL STATE: ICD-10-CM

## 2025-06-06 PROCEDURE — 77080 DXA BONE DENSITY AXIAL: CPT

## 2025-06-11 ENCOUNTER — OFFICE VISIT (OUTPATIENT)
Dept: FAMILY MEDICINE CLINIC | Facility: CLINIC | Age: 75
End: 2025-06-11
Payer: COMMERCIAL

## 2025-06-11 VITALS
HEIGHT: 63 IN | TEMPERATURE: 96.1 F | OXYGEN SATURATION: 100 % | BODY MASS INDEX: 19.05 KG/M2 | WEIGHT: 107.5 LBS | SYSTOLIC BLOOD PRESSURE: 114 MMHG | DIASTOLIC BLOOD PRESSURE: 70 MMHG | HEART RATE: 69 BPM

## 2025-06-11 DIAGNOSIS — Z13.83 SCREENING FOR CARDIOVASCULAR, RESPIRATORY, AND GENITOURINARY DISEASES: ICD-10-CM

## 2025-06-11 DIAGNOSIS — E03.9 HYPOTHYROIDISM, UNSPECIFIED TYPE: ICD-10-CM

## 2025-06-11 DIAGNOSIS — Z13.89 SCREENING FOR CARDIOVASCULAR, RESPIRATORY, AND GENITOURINARY DISEASES: ICD-10-CM

## 2025-06-11 DIAGNOSIS — Z00.00 ANNUAL PHYSICAL EXAM: Primary | Chronic | ICD-10-CM

## 2025-06-11 DIAGNOSIS — M81.0 AGE-RELATED OSTEOPOROSIS WITHOUT CURRENT PATHOLOGICAL FRACTURE: ICD-10-CM

## 2025-06-11 DIAGNOSIS — Z13.6 SCREENING FOR CARDIOVASCULAR, RESPIRATORY, AND GENITOURINARY DISEASES: ICD-10-CM

## 2025-06-11 DIAGNOSIS — Z12.31 SCREENING MAMMOGRAM FOR BREAST CANCER: ICD-10-CM

## 2025-06-11 PROCEDURE — 99397 PER PM REEVAL EST PAT 65+ YR: CPT | Performed by: FAMILY MEDICINE

## 2025-06-11 PROCEDURE — 99213 OFFICE O/P EST LOW 20 MIN: CPT | Performed by: FAMILY MEDICINE

## 2025-06-11 NOTE — PROGRESS NOTES
Adult Annual Physical  Name: Aneta Jimenez      : 1950      MRN: [id number]  Encounter Provider: Danie Kimball MD  Encounter Date: 2025   Encounter department: [facility]    :  Assessment & Plan  Annual physical exam       Hypothyroidism, unspecified type  Appears to be stable clinically. Check TSH. Adjust meds if TSH is not at goal. Recheck 6m  Orders:  •  TSH, 3rd generation with Free T4 reflex; Future    Age-related osteoporosis without current pathological fracture  We DEXA scan results.  Patient does not wish to start medications.  Continue weightbearing exercise, calcium and vitamin D.  Further recommendation based on results       Screening for cardiovascular, respiratory, and genitourinary diseases  Orders:  •  CBC and differential; Future  •  Comprehensive metabolic panel; Future  •  Lipid panel; Future    Screening mammogram for breast cancer             Preventive Screenings:    - Cervical cancer screening: screening not indicated   - Breast cancer screening: screening up-to-date     Immunizations:  - Immunizations due: Prevnar 20, Tdap and Zoster (Shingrix)         History of Present Illness     Adult Annual Physical:  Patient presents for annual physical.   - pt doing well  - pt has some skin lesions she would like to be checked out  - pt had dexa done today.  She is exercising and tries to take in a high calcium diet (cannot tolerate the calcium supplements). .     Diet and Physical Activity:  - Diet/Nutrition: well balanced diet and consuming 3-5 servings of fruits/vegetables daily.  - Exercise: moderate cardiovascular exercise and 5-7 times a week on average.    Depression Screening:  - PHQ-2 Score: 0    General Health:  - Sleep: 7-8 hours of sleep on average.  - Hearing: normal hearing bilateral ears.  - Vision: vision problems and most recent eye exam < 1 year ago. Start of cataracts,  night driving more difficult  - Dental: regular dental  "visits, brushes teeth once daily and floss regularly.    /GYN Health:  - Follows with GYN: no.   - Menopause: postmenopausal.   - History of STDs: no    Advanced Care Planning:  - Has an advanced directive?: no    - Has a durable medical POA?: no    - ACP document given to patient?: yes      Review of Systems   Constitutional: Negative.    HENT: Negative.     Eyes: Negative.    Respiratory: Negative.     Cardiovascular: Negative.    Gastrointestinal: Negative.    Endocrine: Negative.    Genitourinary: Negative.    Musculoskeletal: Negative.    Skin: Negative.    Allergic/Immunologic: Negative.    Neurological: Negative.    Hematological: Negative.    Psychiatric/Behavioral: Negative.       Medical History Reviewed by provider this encounter:  Tobacco  Allergies  Meds  Problems  Med Hx  Surg Hx  Fam Hx     .  Medications Ordered Prior to Encounter[1]   Social History[2]    Objective   /70   Pulse 69   Temp (!) 96.1 °F (35.6 °C)   Ht 5' 2.5\" (1.588 m)   Wt 48.8 kg (107 lb 8 oz)   SpO2 100%   BMI 19.35 kg/m²     Physical Exam  Vitals reviewed.   Constitutional:       Appearance: She is well-developed.   HENT:      Head: Normocephalic and atraumatic.      Right Ear: Tympanic membrane, ear canal and external ear normal.      Left Ear: Tympanic membrane, ear canal and external ear normal.      Nose: Nose normal.      Mouth/Throat:      Mouth: Mucous membranes are moist.     Eyes:      Extraocular Movements: Extraocular movements intact.      Conjunctiva/sclera: Conjunctivae normal.      Pupils: Pupils are equal, round, and reactive to light.     Neck:      Thyroid: No thyromegaly.      Vascular: No JVD.     Cardiovascular:      Rate and Rhythm: Normal rate and regular rhythm.      Pulses: Normal pulses.      Heart sounds: Normal heart sounds. No murmur heard.  Pulmonary:      Effort: Pulmonary effort is normal.      Breath sounds: Normal breath sounds. No wheezing.   Abdominal:      General: Bowel " sounds are normal. There is no distension.      Palpations: Abdomen is soft. There is no mass.      Tenderness: There is no abdominal tenderness.     Musculoskeletal:         General: No swelling, tenderness or deformity. Normal range of motion.      Cervical back: Normal range of motion and neck supple. No tenderness. No muscular tenderness.      Right lower leg: No edema.      Left lower leg: No edema.   Lymphadenopathy:      Cervical: No cervical adenopathy.     Skin:     General: Skin is warm.      Capillary Refill: Capillary refill takes less than 2 seconds.     Neurological:      General: No focal deficit present.      Mental Status: She is alert and oriented to person, place, and time.      Cranial Nerves: No cranial nerve deficit.      Sensory: No sensory deficit.      Motor: No weakness or abnormal muscle tone.      Coordination: Coordination normal.      Gait: Gait normal.      Deep Tendon Reflexes: Reflexes normal.     Psychiatric:         Mood and Affect: Mood normal.         Behavior: Behavior normal.         Thought Content: Thought content normal.         Judgment: Judgment normal.      Comments: PHQ-2/9 Depression Screening    Little interest or pleasure in doing things: 0 - not at all  Feeling down, depressed, or hopeless: 0 - not at all  PHQ-2 Score: 0  PHQ-2 Interpretation: Negative depression screen                   [1]  Current Outpatient Medications on File Prior to Visit   Medication Sig Dispense Refill   • levothyroxine 75 mcg tablet Take 1 tablet by mouth once daily 90 tablet 1     No current facility-administered medications on file prior to visit.   [2]  Social History  Tobacco Use   • Smoking status: Never     Passive exposure: Never   • Smokeless tobacco: Never   Vaping Use   • Vaping status: Never Used   Substance and Sexual Activity   • Alcohol use: Not Currently     Comment: glass of wine only occasionally   • Drug use: Never   • Sexual activity: Yes     Partners: Male     Birth  control/protection: None

## 2025-06-11 NOTE — ASSESSMENT & PLAN NOTE
We DEXA scan results.  Patient does not wish to start medications.  Continue weightbearing exercise, calcium and vitamin D.  Further recommendation based on results

## 2025-06-12 ENCOUNTER — RESULTS FOLLOW-UP (OUTPATIENT)
Dept: FAMILY MEDICINE CLINIC | Facility: CLINIC | Age: 75
End: 2025-06-12

## 2025-06-16 ENCOUNTER — APPOINTMENT (OUTPATIENT)
Dept: LAB | Facility: CLINIC | Age: 75
End: 2025-06-16
Payer: COMMERCIAL

## 2025-06-16 DIAGNOSIS — E03.9 HYPOTHYROIDISM, UNSPECIFIED TYPE: ICD-10-CM

## 2025-06-16 DIAGNOSIS — Z13.83 SCREENING FOR CARDIOVASCULAR, RESPIRATORY, AND GENITOURINARY DISEASES: ICD-10-CM

## 2025-06-16 DIAGNOSIS — Z13.89 SCREENING FOR CARDIOVASCULAR, RESPIRATORY, AND GENITOURINARY DISEASES: ICD-10-CM

## 2025-06-16 DIAGNOSIS — Z13.6 SCREENING FOR CARDIOVASCULAR, RESPIRATORY, AND GENITOURINARY DISEASES: ICD-10-CM

## 2025-06-16 LAB
ALBUMIN SERPL BCG-MCNC: 4.3 G/DL (ref 3.5–5)
ALP SERPL-CCNC: 86 U/L (ref 34–104)
ALT SERPL W P-5'-P-CCNC: 19 U/L (ref 7–52)
ANION GAP SERPL CALCULATED.3IONS-SCNC: 6 MMOL/L (ref 4–13)
AST SERPL W P-5'-P-CCNC: 27 U/L (ref 13–39)
BASOPHILS # BLD AUTO: 0.07 THOUSANDS/ÂΜL (ref 0–0.1)
BASOPHILS NFR BLD AUTO: 1 % (ref 0–1)
BILIRUB SERPL-MCNC: 0.54 MG/DL (ref 0.2–1)
BUN SERPL-MCNC: 20 MG/DL (ref 5–25)
CALCIUM SERPL-MCNC: 9.7 MG/DL (ref 8.4–10.2)
CHLORIDE SERPL-SCNC: 103 MMOL/L (ref 96–108)
CHOLEST SERPL-MCNC: 193 MG/DL (ref ?–200)
CO2 SERPL-SCNC: 29 MMOL/L (ref 21–32)
CREAT SERPL-MCNC: 0.66 MG/DL (ref 0.6–1.3)
EOSINOPHIL # BLD AUTO: 0.24 THOUSAND/ÂΜL (ref 0–0.61)
EOSINOPHIL NFR BLD AUTO: 4 % (ref 0–6)
ERYTHROCYTE [DISTWIDTH] IN BLOOD BY AUTOMATED COUNT: 12.4 % (ref 11.6–15.1)
GFR SERPL CREATININE-BSD FRML MDRD: 88 ML/MIN/1.73SQ M
GLUCOSE P FAST SERPL-MCNC: 96 MG/DL (ref 65–99)
HCT VFR BLD AUTO: 40.5 % (ref 34.8–46.1)
HDLC SERPL-MCNC: 70 MG/DL
HGB BLD-MCNC: 13.1 G/DL (ref 11.5–15.4)
IMM GRANULOCYTES # BLD AUTO: 0.02 THOUSAND/UL (ref 0–0.2)
IMM GRANULOCYTES NFR BLD AUTO: 0 % (ref 0–2)
LDLC SERPL CALC-MCNC: 103 MG/DL (ref 0–100)
LYMPHOCYTES # BLD AUTO: 2.15 THOUSANDS/ÂΜL (ref 0.6–4.47)
LYMPHOCYTES NFR BLD AUTO: 36 % (ref 14–44)
MCH RBC QN AUTO: 31.5 PG (ref 26.8–34.3)
MCHC RBC AUTO-ENTMCNC: 32.3 G/DL (ref 31.4–37.4)
MCV RBC AUTO: 97 FL (ref 82–98)
MONOCYTES # BLD AUTO: 0.45 THOUSAND/ÂΜL (ref 0.17–1.22)
MONOCYTES NFR BLD AUTO: 8 % (ref 4–12)
NEUTROPHILS # BLD AUTO: 3.08 THOUSANDS/ÂΜL (ref 1.85–7.62)
NEUTS SEG NFR BLD AUTO: 51 % (ref 43–75)
NONHDLC SERPL-MCNC: 123 MG/DL
NRBC BLD AUTO-RTO: 0 /100 WBCS
PLATELET # BLD AUTO: 254 THOUSANDS/UL (ref 149–390)
PMV BLD AUTO: 10.5 FL (ref 8.9–12.7)
POTASSIUM SERPL-SCNC: 6.1 MMOL/L (ref 3.5–5.3)
PROT SERPL-MCNC: 7 G/DL (ref 6.4–8.4)
RBC # BLD AUTO: 4.16 MILLION/UL (ref 3.81–5.12)
SODIUM SERPL-SCNC: 138 MMOL/L (ref 135–147)
TRIGL SERPL-MCNC: 102 MG/DL (ref ?–150)
TSH SERPL DL<=0.05 MIU/L-ACNC: 1.71 UIU/ML (ref 0.45–4.5)
WBC # BLD AUTO: 6.01 THOUSAND/UL (ref 4.31–10.16)

## 2025-06-16 PROCEDURE — 36415 COLL VENOUS BLD VENIPUNCTURE: CPT

## 2025-06-16 PROCEDURE — 85025 COMPLETE CBC W/AUTO DIFF WBC: CPT

## 2025-06-16 PROCEDURE — 84443 ASSAY THYROID STIM HORMONE: CPT

## 2025-06-16 PROCEDURE — 80053 COMPREHEN METABOLIC PANEL: CPT

## 2025-06-16 PROCEDURE — 80061 LIPID PANEL: CPT

## 2025-06-17 ENCOUNTER — RESULTS FOLLOW-UP (OUTPATIENT)
Dept: FAMILY MEDICINE CLINIC | Facility: CLINIC | Age: 75
End: 2025-06-17

## 2025-06-17 ENCOUNTER — APPOINTMENT (OUTPATIENT)
Dept: LAB | Facility: CLINIC | Age: 75
End: 2025-06-17
Payer: COMMERCIAL

## 2025-06-17 DIAGNOSIS — E87.5 HYPERKALEMIA: ICD-10-CM

## 2025-06-17 DIAGNOSIS — E87.5 HYPERKALEMIA: Primary | ICD-10-CM

## 2025-06-17 LAB — POTASSIUM SERPL-SCNC: 5.2 MMOL/L (ref 3.5–5.3)

## 2025-06-17 PROCEDURE — 36415 COLL VENOUS BLD VENIPUNCTURE: CPT

## 2025-06-17 PROCEDURE — 84132 ASSAY OF SERUM POTASSIUM: CPT

## 2025-06-18 ENCOUNTER — RESULTS FOLLOW-UP (OUTPATIENT)
Dept: FAMILY MEDICINE CLINIC | Facility: CLINIC | Age: 75
End: 2025-06-18